# Patient Record
Sex: MALE | Race: WHITE | NOT HISPANIC OR LATINO | URBAN - METROPOLITAN AREA
[De-identification: names, ages, dates, MRNs, and addresses within clinical notes are randomized per-mention and may not be internally consistent; named-entity substitution may affect disease eponyms.]

---

## 2023-10-02 LAB
6-ACETYLMORPHINE: <25 NG/ML
7-AMINOCLONAZEPAM: <25 NG/ML
ALPHA-HYDROXYALPRAZOLAM: <25 NG/ML
ALPHA-HYDROXYMIDAZOLAM: <25 NG/ML
ALPRAZOLAM: <25 NG/ML
AMPHETAMINE (PRESENCE) IN URINE BY SCREEN METHOD: NORMAL
BARBITURATES PRESENCE IN URINE BY SCREEN METHOD: NORMAL
CANNABINOIDS IN URINE BY SCREEN METHOD: NORMAL
CHLORDIAZEPOXIDE: <25 NG/ML
CLONAZEPAM: <25 NG/ML
COCAINE (PRESENCE) IN URINE BY SCREEN METHOD: NORMAL
CODEINE: <50 NG/ML
CREATINE, URINE FOR DRUG: 122.4 MG/DL
DIAZEPAM: <25 NG/ML
DRUG SCREEN COMMENT URINE: NORMAL
EDDP: <25 NG/ML
FENTANYL CONFIRMATION, URINE: <2.5 NG/ML
HYDROCODONE: <25 NG/ML
HYDROMORPHONE: <25 NG/ML
LORAZEPAM: <25 NG/ML
METHADONE CONFIRMATION,URINE: <25 NG/ML
MIDAZOLAM: <25 NG/ML
MORPHINE URINE: <50 NG/ML
NORDIAZEPAM: <25 NG/ML
NORFENTANYL: <2.5 NG/ML
NORHYDROCODONE: <25 NG/ML
NOROXYCODONE: <25 NG/ML
O-DESMETHYLTRAMADOL: <50 NG/ML
OXAZEPAM: <25 NG/ML
OXYCODONE: <25 NG/ML
OXYMORPHONE: <25 NG/ML
PHENCYCLIDINE (PRESENCE) IN URINE BY SCREEN METHOD: NORMAL
TEMAZEPAM: <25 NG/ML
TRAMADOL: <50 NG/ML
ZOLPIDEM METABOLITE (ZCA): <25 NG/ML
ZOLPIDEM: <25 NG/ML

## 2023-10-12 DIAGNOSIS — F98.8 ATTENTION DEFICIT DISORDER (ADD) WITHOUT HYPERACTIVITY: ICD-10-CM

## 2023-10-12 RX ORDER — METHYLPHENIDATE HYDROCHLORIDE 20 MG/1
20 TABLET ORAL DAILY
Qty: 30 TABLET | Refills: 0 | Status: SHIPPED | OUTPATIENT
Start: 2023-10-12 | End: 2023-11-10 | Stop reason: SDUPTHER

## 2023-10-31 ENCOUNTER — TELEPHONE (OUTPATIENT)
Dept: VASCULAR MEDICINE | Facility: HOSPITAL | Age: 53
End: 2023-10-31
Payer: COMMERCIAL

## 2023-11-08 DIAGNOSIS — S35.228D: Primary | ICD-10-CM

## 2023-11-10 DIAGNOSIS — F98.8 ATTENTION DEFICIT DISORDER (ADD) WITHOUT HYPERACTIVITY: ICD-10-CM

## 2023-11-10 RX ORDER — METHYLPHENIDATE HYDROCHLORIDE 20 MG/1
20 TABLET ORAL DAILY
Qty: 30 TABLET | Refills: 0 | Status: SHIPPED | OUTPATIENT
Start: 2023-11-10 | End: 2023-12-05 | Stop reason: SDUPTHER

## 2023-11-21 ENCOUNTER — APPOINTMENT (OUTPATIENT)
Dept: CARDIOLOGY | Facility: HOSPITAL | Age: 53
End: 2023-11-21

## 2023-12-05 ENCOUNTER — TELEPHONE (OUTPATIENT)
Dept: VASCULAR MEDICINE | Facility: CLINIC | Age: 53
End: 2023-12-05

## 2023-12-05 ENCOUNTER — OFFICE VISIT (OUTPATIENT)
Dept: VASCULAR SURGERY | Facility: CLINIC | Age: 53
End: 2023-12-05
Payer: COMMERCIAL

## 2023-12-05 ENCOUNTER — CLINICAL SUPPORT (OUTPATIENT)
Dept: VASCULAR MEDICINE | Facility: CLINIC | Age: 53
End: 2023-12-05
Payer: COMMERCIAL

## 2023-12-05 VITALS — DIASTOLIC BLOOD PRESSURE: 77 MMHG | SYSTOLIC BLOOD PRESSURE: 138 MMHG | HEART RATE: 76 BPM

## 2023-12-05 DIAGNOSIS — I10 PRIMARY HYPERTENSION: ICD-10-CM

## 2023-12-05 DIAGNOSIS — Z79.899 MEDICATION MANAGEMENT: ICD-10-CM

## 2023-12-05 DIAGNOSIS — F98.8 ATTENTION DEFICIT DISORDER (ADD) WITHOUT HYPERACTIVITY: ICD-10-CM

## 2023-12-05 DIAGNOSIS — I77.79 DISSECTION OF MESENTERIC ARTERY (MULTI): Primary | ICD-10-CM

## 2023-12-05 DIAGNOSIS — I77.79 DISSECTION OF OTHER SPECIFIED ARTERY (MULTI): ICD-10-CM

## 2023-12-05 DIAGNOSIS — S35.228D: ICD-10-CM

## 2023-12-05 PROCEDURE — 99212 OFFICE O/P EST SF 10 MIN: CPT | Performed by: SURGERY

## 2023-12-05 PROCEDURE — 3075F SYST BP GE 130 - 139MM HG: CPT | Performed by: SURGERY

## 2023-12-05 PROCEDURE — 93975 VASCULAR STUDY: CPT | Performed by: SURGERY

## 2023-12-05 PROCEDURE — 3078F DIAST BP <80 MM HG: CPT | Performed by: SURGERY

## 2023-12-05 PROCEDURE — 93975 VASCULAR STUDY: CPT

## 2023-12-05 RX ORDER — METHYLPHENIDATE HYDROCHLORIDE 20 MG/1
TABLET ORAL
Qty: 30 TABLET | Refills: 0 | Status: SHIPPED | OUTPATIENT
Start: 2023-12-05 | End: 2024-01-08 | Stop reason: SDUPTHER

## 2023-12-05 RX ORDER — METHYLPHENIDATE HYDROCHLORIDE EXTENDED RELEASE 10 MG/1
TABLET ORAL
Qty: 30 TABLET | Refills: 0 | Status: SHIPPED | OUTPATIENT
Start: 2023-12-05 | End: 2024-01-08 | Stop reason: SDUPTHER

## 2023-12-05 RX ORDER — DULOXETIN HYDROCHLORIDE 60 MG/1
60 CAPSULE, DELAYED RELEASE ORAL DAILY
COMMUNITY
End: 2024-01-24 | Stop reason: SDUPTHER

## 2023-12-05 RX ORDER — ARIPIPRAZOLE 5 MG/1
5 TABLET ORAL DAILY
COMMUNITY
End: 2024-01-24 | Stop reason: SDUPTHER

## 2023-12-05 ASSESSMENT — COLUMBIA-SUICIDE SEVERITY RATING SCALE - C-SSRS
6. HAVE YOU EVER DONE ANYTHING, STARTED TO DO ANYTHING, OR PREPARED TO DO ANYTHING TO END YOUR LIFE?: NO
1. IN THE PAST MONTH, HAVE YOU WISHED YOU WERE DEAD OR WISHED YOU COULD GO TO SLEEP AND NOT WAKE UP?: NO
2. HAVE YOU ACTUALLY HAD ANY THOUGHTS OF KILLING YOURSELF?: NO

## 2023-12-05 ASSESSMENT — PATIENT HEALTH QUESTIONNAIRE - PHQ9
1. LITTLE INTEREST OR PLEASURE IN DOING THINGS: NOT AT ALL
2. FEELING DOWN, DEPRESSED OR HOPELESS: NOT AT ALL
SUM OF ALL RESPONSES TO PHQ9 QUESTIONS 1 AND 2: 0

## 2023-12-05 ASSESSMENT — LIFESTYLE VARIABLES
HOW OFTEN DO YOU HAVE SIX OR MORE DRINKS ON ONE OCCASION: NEVER
SKIP TO QUESTIONS 9-10: 1
HOW OFTEN DO YOU HAVE A DRINK CONTAINING ALCOHOL: NEVER
AUDIT-C TOTAL SCORE: 0
HOW MANY STANDARD DRINKS CONTAINING ALCOHOL DO YOU HAVE ON A TYPICAL DAY: PATIENT DOES NOT DRINK

## 2023-12-05 ASSESSMENT — ENCOUNTER SYMPTOMS
DEPRESSION: 0
OCCASIONAL FEELINGS OF UNSTEADINESS: 0
LOSS OF SENSATION IN FEET: 0

## 2023-12-05 NOTE — PROGRESS NOTES
Known to us from hospitalization in September with abdominal pain and findings of SMA dissection.  He did not follow-up with us as planned at 1 month and his medications lapsed including lisinopril and his dual antiplatelet therapy.  He currently has no symptoms of pain.  He wishes to return to work.  His ultrasound today shows no dissection and no stenosis.  His abdomen is soft and flaccid.    He does not have a primary care physician.  I am going to refer him to 1 for long-term management of his hypertension.  He is moderately hypertensive today to 138 mm with a systolic.  I went to have him resume his lisinopril and an aspirin only.  He may return to work without any restrictions.  He has been over 2 months since his event and he is completely asymptomatic and he has good flows on his duplex.  He may follow-up with us as needed.    Total time 15min

## 2023-12-13 ENCOUNTER — OFFICE VISIT (OUTPATIENT)
Dept: CARDIOLOGY | Facility: CLINIC | Age: 53
End: 2023-12-13
Payer: COMMERCIAL

## 2023-12-13 ENCOUNTER — LAB (OUTPATIENT)
Dept: LAB | Facility: LAB | Age: 53
End: 2023-12-13
Payer: COMMERCIAL

## 2023-12-13 VITALS
DIASTOLIC BLOOD PRESSURE: 87 MMHG | WEIGHT: 180 LBS | HEIGHT: 65 IN | BODY MASS INDEX: 29.99 KG/M2 | RESPIRATION RATE: 18 BRPM | SYSTOLIC BLOOD PRESSURE: 132 MMHG | HEART RATE: 63 BPM

## 2023-12-13 DIAGNOSIS — I77.79 DISSECTION OF MESENTERIC ARTERY (MULTI): ICD-10-CM

## 2023-12-13 DIAGNOSIS — I77.79 DISSECTION OF MESENTERIC ARTERY (MULTI): Primary | ICD-10-CM

## 2023-12-13 DIAGNOSIS — I10 PRIMARY HYPERTENSION: ICD-10-CM

## 2023-12-13 DIAGNOSIS — Z79.899 MEDICATION MANAGEMENT: ICD-10-CM

## 2023-12-13 LAB
CHOLEST SERPL-MCNC: 280 MG/DL (ref 0–199)
CHOLESTEROL/HDL RATIO: 7.4
HDLC SERPL-MCNC: 37.6 MG/DL
LDLC SERPL CALC-MCNC: 173 MG/DL
NON HDL CHOLESTEROL: 242 MG/DL (ref 0–149)
TRIGL SERPL-MCNC: 349 MG/DL (ref 0–149)
VLDL: 70 MG/DL (ref 0–40)

## 2023-12-13 PROCEDURE — 80061 LIPID PANEL: CPT

## 2023-12-13 PROCEDURE — 36415 COLL VENOUS BLD VENIPUNCTURE: CPT

## 2023-12-13 PROCEDURE — 99215 OFFICE O/P EST HI 40 MIN: CPT | Performed by: INTERNAL MEDICINE

## 2023-12-13 PROCEDURE — 3075F SYST BP GE 130 - 139MM HG: CPT | Performed by: INTERNAL MEDICINE

## 2023-12-13 PROCEDURE — 80324 DRUG SCREEN AMPHETAMINES 1/2: CPT

## 2023-12-13 PROCEDURE — 3079F DIAST BP 80-89 MM HG: CPT | Performed by: INTERNAL MEDICINE

## 2023-12-13 RX ORDER — LISINOPRIL 10 MG/1
10 TABLET ORAL DAILY
Qty: 90 TABLET | Refills: 3 | Status: SHIPPED | OUTPATIENT
Start: 2023-12-13 | End: 2024-12-12

## 2023-12-13 RX ORDER — ATORVASTATIN CALCIUM 40 MG/1
40 TABLET, FILM COATED ORAL DAILY
Qty: 90 TABLET | Refills: 3 | Status: SHIPPED | OUTPATIENT
Start: 2023-12-13 | End: 2024-12-12

## 2023-12-13 ASSESSMENT — PATIENT HEALTH QUESTIONNAIRE - PHQ9
1. LITTLE INTEREST OR PLEASURE IN DOING THINGS: NOT AT ALL
SUM OF ALL RESPONSES TO PHQ9 QUESTIONS 1 AND 2: 0
2. FEELING DOWN, DEPRESSED OR HOPELESS: NOT AT ALL

## 2023-12-13 NOTE — PROGRESS NOTES
"OUTPATIENT CONSULTATION -  VASCULAR MEDICINE    DOS: 2023    REQUESTING PHYSICIAN:  Jovana Vazquez; No PCP    REASON FOR CONSULT:  here for hospital follow up of SMA dissection    HISTORY OF PRESENT ILLNESS:     54 yo man here for hospital follow up of SMA dissection. Admitted to [facility] [place] -2023 with ABD pain. CT c/w SMA dissection. Was at a  and developed sudden onset ABD pain. No preceding trauma but was a \"very emotional\" experience. +associated diaphoresis and emesis x1. CTAP - dissection of the SMA 5.8 cm from the OS with multiple proximal branches with decreased opacification. Rx heparin gtt and sent to [facility] [place]. He is a never smoker. During admission seen by  recommended DAPT, atorvastatin and lisinopril. DC home with atorvastatin 10 mg (?). Did not keep initial follow up - has been out of ASA, clopidogrel, lisinopril and atorvastatin - only had a 30 day supply. Seen recently by Dr. Ortiz and recommended to resume the lisinopril and ASA - but no Rx was sent. Also referred for PCP.      PMH/PSH:    GERD  ADD  Depression    FAMILY HISTORY:     No CTD  No AAA or dissection    SOCIAL HISTORY:     Tobacco never  Employment works as a  - reports off lifting estrictions per Dr. Ortiz    REVIEW OF SYSTEMS:     No fevers, chills, weight is stable  No sores, ulcers, rashes, skin lesions  No HA, SZ, syncope, stroke, TIA  No CP,  chest pressure  No cough, SOB  No ABD pain  No N/V/D/C  No BRBPR, melena, hematuria  No bleeding  No edema, no calf pain    PHYSICAL EXAMINATION:   /87 (BP Location: Left arm, Patient Position: Sitting)   Pulse 63   Resp 18   Ht 1.651 m (5' 5\")   Wt 81.6 kg (180 lb)   BMI 29.95 kg/m²     Gen: Appears well, NAD  HEENT: WNL  No carotid bruits  Chest: CTA  CVS: regular without murmur +[id number]  Abd: soft, NT/ND, no bruits,   Ext: no edema, nontender  Skin: good condition without wounds or lesions  Pulses: DP 2+; PT 2+  Neuro: grossly normal, CN intact, BEBO x 4  Mood and " affect appropriate    ADDITIONAL DATA:   No compression worn. Calf measurements; R- 38.5 CM L 39.0 CM.    RENÉE - negative    US 12/5/2023:  CONCLUSIONS:  Mesenteric: Celiac artery demonstrates no evidence of hemodynamically significant stenosis, SMA demonstrates no evidence of hemodynamically significant stenosis, Hepatic artery appears widely patent and the ELENA appears widely patent. Mild wall irregularity noted within superior mesenteric artery. Unable to adequately visualize splenic artery due to overlying bowel gas.  Additional     US 9/26/2023:  CONCLUSIONS:  Mesenteric: The celiac, hepatic, splenic and SMA appear widely patent with no evidence of stenosis. The patient was NPO for this study. There appears to be echogenic material noted along the wall of the SMA.    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  +S4 - check ECHO  Discussed the need for a PCP

## 2023-12-14 ENCOUNTER — HOSPITAL ENCOUNTER (OUTPATIENT)
Dept: CARDIOLOGY | Facility: CLINIC | Age: 53
Discharge: HOME | End: 2023-12-14
Payer: COMMERCIAL

## 2023-12-14 ENCOUNTER — TELEPHONE (OUTPATIENT)
Dept: CARDIOLOGY | Facility: HOSPITAL | Age: 53
End: 2023-12-14

## 2023-12-14 DIAGNOSIS — R01.1 CARDIAC MURMUR, UNSPECIFIED: ICD-10-CM

## 2023-12-14 DIAGNOSIS — I10 PRIMARY HYPERTENSION: ICD-10-CM

## 2023-12-14 DIAGNOSIS — I48.91 ATRIAL FIBRILLATION, UNSPECIFIED TYPE (MULTI): Primary | ICD-10-CM

## 2023-12-14 DIAGNOSIS — I77.79 DISSECTION OF MESENTERIC ARTERY (MULTI): ICD-10-CM

## 2023-12-14 LAB
AORTIC VALVE MEAN GRADIENT: 3.2
AORTIC VALVE PEAK VELOCITY: 1.24
AV PEAK GRADIENT: 6.1
AVA (PEAK VEL): 3.48
AVA (VTI): 3.75
EJECTION FRACTION APICAL 4 CHAMBER: 64.1
EJECTION FRACTION: 67
LEFT ATRIUM VOLUME AREA LENGTH INDEX BSA: 21.9
LEFT VENTRICLE INTERNAL DIMENSION DIASTOLE: 3.87 (ref 3.5–6)
LEFT VENTRICULAR OUTFLOW TRACT DIAMETER: 2.18
MITRAL VALVE E/A RATIO: 1.26
MITRAL VALVE E/E' RATIO: 7.02
RIGHT VENTRICLE FREE WALL PEAK S': 13
TRICUSPID ANNULAR PLANE SYSTOLIC EXCURSION: 2

## 2023-12-14 PROCEDURE — 93306 TTE W/DOPPLER COMPLETE: CPT | Performed by: SPECIALIST

## 2023-12-14 PROCEDURE — 93306 TTE W/DOPPLER COMPLETE: CPT

## 2023-12-16 LAB
AMPHET UR-MCNC: <50 NG/ML
MDA UR-MCNC: <200 NG/ML
MDEA UR-MCNC: <200 NG/ML
MDMA UR-MCNC: <200 NG/ML
METHAMPHET UR-MCNC: <200 NG/ML
PHENTERMINE UR CFM-MCNC: <200 NG/ML

## 2023-12-28 ENCOUNTER — APPOINTMENT (OUTPATIENT)
Dept: BEHAVIORAL HEALTH | Facility: CLINIC | Age: 53
End: 2023-12-28
Payer: COMMERCIAL

## 2024-01-04 ENCOUNTER — APPOINTMENT (OUTPATIENT)
Dept: BEHAVIORAL HEALTH | Facility: CLINIC | Age: 54
End: 2024-01-04
Payer: COMMERCIAL

## 2024-01-08 DIAGNOSIS — F98.8 ATTENTION DEFICIT DISORDER (ADD) WITHOUT HYPERACTIVITY: ICD-10-CM

## 2024-01-08 DIAGNOSIS — F90.0 ATTENTION DEFICIT HYPERACTIVITY DISORDER (ADHD), PREDOMINANTLY INATTENTIVE TYPE: ICD-10-CM

## 2024-01-08 PROBLEM — F90.9 ADHD (ATTENTION DEFICIT HYPERACTIVITY DISORDER): Status: ACTIVE | Noted: 2024-01-08

## 2024-01-08 RX ORDER — METHYLPHENIDATE HYDROCHLORIDE 20 MG/1
TABLET ORAL
Qty: 30 TABLET | Refills: 0 | Status: SHIPPED | OUTPATIENT
Start: 2024-01-08 | End: 2024-02-05 | Stop reason: SDUPTHER

## 2024-01-08 RX ORDER — METHYLPHENIDATE HYDROCHLORIDE EXTENDED RELEASE 10 MG/1
TABLET ORAL
Qty: 30 TABLET | Refills: 0 | Status: SHIPPED | OUTPATIENT
Start: 2024-01-08 | End: 2024-02-05 | Stop reason: SDUPTHER

## 2024-01-15 ENCOUNTER — APPOINTMENT (OUTPATIENT)
Dept: BEHAVIORAL HEALTH | Facility: CLINIC | Age: 54
End: 2024-01-15
Payer: COMMERCIAL

## 2024-01-24 ENCOUNTER — OFFICE VISIT (OUTPATIENT)
Dept: BEHAVIORAL HEALTH | Facility: CLINIC | Age: 54
End: 2024-01-24
Payer: COMMERCIAL

## 2024-01-24 VITALS
DIASTOLIC BLOOD PRESSURE: 72 MMHG | WEIGHT: 200 LBS | HEART RATE: 89 BPM | BODY MASS INDEX: 33.28 KG/M2 | SYSTOLIC BLOOD PRESSURE: 122 MMHG

## 2024-01-24 DIAGNOSIS — F31.9 BIPOLAR AND RELATED DISORDER (MULTI): ICD-10-CM

## 2024-01-24 DIAGNOSIS — F90.0 ATTENTION DEFICIT HYPERACTIVITY DISORDER (ADHD), PREDOMINANTLY INATTENTIVE TYPE: ICD-10-CM

## 2024-01-24 PROCEDURE — 99213 OFFICE O/P EST LOW 20 MIN: CPT | Performed by: PSYCHIATRY & NEUROLOGY

## 2024-01-24 RX ORDER — ARIPIPRAZOLE 5 MG/1
5 TABLET ORAL DAILY
Qty: 90 TABLET | Refills: 0 | Status: SHIPPED | OUTPATIENT
Start: 2024-01-24 | End: 2024-04-25 | Stop reason: SDUPTHER

## 2024-01-24 RX ORDER — DULOXETIN HYDROCHLORIDE 60 MG/1
60 CAPSULE, DELAYED RELEASE ORAL DAILY
Qty: 90 CAPSULE | Refills: 1 | Status: SHIPPED | OUTPATIENT
Start: 2024-01-24 | End: 2024-04-25 | Stop reason: SDUPTHER

## 2024-01-24 SDOH — ECONOMIC STABILITY: FOOD INSECURITY: WITHIN THE PAST 12 MONTHS, THE FOOD YOU BOUGHT JUST DIDN'T LAST AND YOU DIDN'T HAVE MONEY TO GET MORE.: NEVER TRUE

## 2024-01-24 SDOH — ECONOMIC STABILITY: INCOME INSECURITY: IN THE LAST 12 MONTHS, WAS THERE A TIME WHEN YOU WERE NOT ABLE TO PAY THE MORTGAGE OR RENT ON TIME?: NO

## 2024-01-24 SDOH — ECONOMIC STABILITY: HOUSING INSECURITY
IN THE LAST 12 MONTHS, WAS THERE A TIME WHEN YOU DID NOT HAVE A STEADY PLACE TO SLEEP OR SLEPT IN A SHELTER (INCLUDING NOW)?: NO

## 2024-01-24 SDOH — ECONOMIC STABILITY: TRANSPORTATION INSECURITY
IN THE PAST 12 MONTHS, HAS LACK OF TRANSPORTATION KEPT YOU FROM MEETINGS, WORK, OR FROM GETTING THINGS NEEDED FOR DAILY LIVING?: NO

## 2024-01-24 SDOH — ECONOMIC STABILITY: HOUSING INSECURITY: IN THE LAST 12 MONTHS, HOW MANY PLACES HAVE YOU LIVED?: 1

## 2024-01-24 SDOH — ECONOMIC STABILITY: GENERAL
WHICH OF THE FOLLOWING WOULD YOU LIKE TO GET CONNECTED TO IN ORDER TO RECEIVE A DISCOUNT OR FOR FREE? (CHOOSE ALL THAT APPLY): NONE OF THESE

## 2024-01-24 SDOH — ECONOMIC STABILITY: FOOD INSECURITY: WITHIN THE PAST 12 MONTHS, YOU WORRIED THAT YOUR FOOD WOULD RUN OUT BEFORE YOU GOT MONEY TO BUY MORE.: NEVER TRUE

## 2024-01-24 SDOH — HEALTH STABILITY: PHYSICAL HEALTH: ON AVERAGE, HOW MANY MINUTES DO YOU ENGAGE IN EXERCISE AT THIS LEVEL?: 60 MIN

## 2024-01-24 SDOH — HEALTH STABILITY: PHYSICAL HEALTH: ON AVERAGE, HOW MANY DAYS PER WEEK DO YOU ENGAGE IN MODERATE TO STRENUOUS EXERCISE (LIKE A BRISK WALK)?: 3 DAYS

## 2024-01-24 SDOH — ECONOMIC STABILITY: GENERAL
WHICH OF THE FOLLOWING DO YOU KNOW HOW TO USE AND HAVE ACCESS TO EVERY DAY? (CHOOSE ALL THAT APPLY): SMARTPHONE WITH CELLULAR DATA PLAN;DESKTOP COMPUTER, LAPTOP COMPUTER, OR TABLET WITH BROADBAND INTERNET CONNECTION

## 2024-01-24 SDOH — ECONOMIC STABILITY: TRANSPORTATION INSECURITY
IN THE PAST 12 MONTHS, HAS THE LACK OF TRANSPORTATION KEPT YOU FROM MEDICAL APPOINTMENTS OR FROM GETTING MEDICATIONS?: NO

## 2024-01-24 ASSESSMENT — SOCIAL DETERMINANTS OF HEALTH (SDOH)
IN A TYPICAL WEEK, HOW MANY TIMES DO YOU TALK ON THE PHONE WITH FAMILY, FRIENDS, OR NEIGHBORS?: MORE THAN THREE TIMES A WEEK
HOW OFTEN DO YOU ATTEND CHURCH OR RELIGIOUS SERVICES?: NEVER
WITHIN THE LAST YEAR, HAVE YOU BEEN KICKED, HIT, SLAPPED, OR OTHERWISE PHYSICALLY HURT BY YOUR PARTNER OR EX-PARTNER?: NO
WITHIN THE LAST YEAR, HAVE YOU BEEN AFRAID OF YOUR PARTNER OR EX-PARTNER?: NO
WITHIN THE LAST YEAR, HAVE TO BEEN RAPED OR FORCED TO HAVE ANY KIND OF SEXUAL ACTIVITY BY YOUR PARTNER OR EX-PARTNER?: NO
WITHIN THE LAST YEAR, HAVE YOU BEEN HUMILIATED OR EMOTIONALLY ABUSED IN OTHER WAYS BY YOUR PARTNER OR EX-PARTNER?: NO
ARE YOU MARRIED, WIDOWED, DIVORCED, SEPARATED, NEVER MARRIED, OR LIVING WITH A PARTNER?: NEVER MARRIED
HOW OFTEN DO YOU ATTENT MEETINGS OF THE CLUB OR ORGANIZATION YOU BELONG TO?: MORE THAN 4 TIMES PER YEAR
HOW HARD IS IT FOR YOU TO PAY FOR THE VERY BASICS LIKE FOOD, HOUSING, MEDICAL CARE, AND HEATING?: NOT HARD AT ALL
IN THE PAST 12 MONTHS, HAS THE ELECTRIC, GAS, OIL, OR WATER COMPANY THREATENED TO SHUT OFF SERVICE IN YOUR HOME?: NO
DO YOU BELONG TO ANY CLUBS OR ORGANIZATIONS SUCH AS CHURCH GROUPS UNIONS, FRATERNAL OR ATHLETIC GROUPS, OR SCHOOL GROUPS?: YES
HOW OFTEN DO YOU GET TOGETHER WITH FRIENDS OR RELATIVES?: MORE THAN THREE TIMES A WEEK

## 2024-01-24 NOTE — PROGRESS NOTES
Subjective   Patient ID: Adán Liu is a 53 y.o. male who presents for No chief complaint on file. I am doing pretty good.     The patient is alert, fully oriented, language is intact, and recent and remote memory intact. The patient denies any suicidal or homicidal ideation or plans. The patient presents with no depressive, manic or psychotic symptoms. Thought is logical and clear. No disturbances of judgment or insight are exhibited. No behavioral disturbances are present on examination.        Review of Systems   Neurological:         The patient is alert, fully oriented, language is intact, and recent and remote memory intact. The patient denies any suicidal or homicidal ideation or plans. The patient presents with no depressive, manic or psychotic symptoms. Thought is logical and clear. No disturbances of judgment or insight are exhibited. No behavioral disturbances are present on examination.       Psychiatric/Behavioral:          The patient is alert, fully oriented, language is intact, and recent and remote memory intact. The patient denies any suicidal or homicidal ideation or plans. The patient presents with no depressive, manic or psychotic symptoms. Thought is logical and clear. No disturbances of judgment or insight are exhibited. No behavioral disturbances are present on examination.       All other systems reviewed and are negative.      Objective   Physical Exam  Constitutional:       Appearance: Normal appearance.   Neurological:      General: No focal deficit present.      Mental Status: He is alert and oriented to person, place, and time. Mental status is at baseline.      Comments: The patient is alert, fully oriented, language is intact, and recent and remote memory intact. The patient denies any suicidal or homicidal ideation or plans. The patient presents with no depressive, manic or psychotic symptoms. Thought is logical and clear. No disturbances of judgment or insight are exhibited. No  behavioral disturbances are present on examination.       Psychiatric:         Mood and Affect: Mood normal.         Behavior: Behavior normal.         Thought Content: Thought content normal.         Judgment: Judgment normal.      Comments: The patient is alert, fully oriented, language is intact, and recent and remote memory intact. The patient denies any suicidal or homicidal ideation or plans. The patient presents with no depressive, manic or psychotic symptoms. Thought is logical and clear. No disturbances of judgment or insight are exhibited. No behavioral disturbances are present on examination.             Lab Review:       Assessment/Plan   The FDA risks, benefits & alternatives to the medications prescribed were explained to you today. You were able to understand & repeat these risks, benefits & alternatives to these prescribed medications. You have agreed to proceed with treatment with the medications discussed based on the conclusion that the benefit outweighs the risks of this treatment regimen: continue aripiprazole 5 mg daily, duloxetine 60 mg daily, and methylphenidate 20 mg in the morning before work and methylphenidate ER 10 mg in the early afternoon daily. Your next contract signature and urine toxicology screen will be due in September of 2024. Follow up in late April of 2024.

## 2024-02-05 DIAGNOSIS — F98.8 ATTENTION DEFICIT DISORDER (ADD) WITHOUT HYPERACTIVITY: ICD-10-CM

## 2024-02-05 RX ORDER — METHYLPHENIDATE HYDROCHLORIDE EXTENDED RELEASE 10 MG/1
TABLET ORAL
Qty: 30 TABLET | Refills: 0 | Status: SHIPPED | OUTPATIENT
Start: 2024-02-05 | End: 2024-03-06 | Stop reason: SDUPTHER

## 2024-02-05 RX ORDER — METHYLPHENIDATE HYDROCHLORIDE 20 MG/1
TABLET ORAL
Qty: 30 TABLET | Refills: 0 | Status: SHIPPED | OUTPATIENT
Start: 2024-02-05 | End: 2024-03-06 | Stop reason: SDUPTHER

## 2024-03-06 DIAGNOSIS — F98.8 ATTENTION DEFICIT DISORDER (ADD) WITHOUT HYPERACTIVITY: ICD-10-CM

## 2024-03-06 RX ORDER — METHYLPHENIDATE HYDROCHLORIDE 20 MG/1
TABLET ORAL
Qty: 30 TABLET | Refills: 0 | Status: SHIPPED | OUTPATIENT
Start: 2024-03-06 | End: 2024-04-02 | Stop reason: SDUPTHER

## 2024-03-06 RX ORDER — METHYLPHENIDATE HYDROCHLORIDE EXTENDED RELEASE 10 MG/1
TABLET ORAL
Qty: 30 TABLET | Refills: 0 | Status: SHIPPED | OUTPATIENT
Start: 2024-03-06 | End: 2024-04-02 | Stop reason: SDUPTHER

## 2024-03-13 ENCOUNTER — APPOINTMENT (OUTPATIENT)
Dept: CARDIOLOGY | Facility: CLINIC | Age: 54
End: 2024-03-13
Payer: COMMERCIAL

## 2024-04-02 DIAGNOSIS — F98.8 ATTENTION DEFICIT DISORDER (ADD) WITHOUT HYPERACTIVITY: ICD-10-CM

## 2024-04-02 RX ORDER — METHYLPHENIDATE HYDROCHLORIDE EXTENDED RELEASE 10 MG/1
TABLET ORAL
Qty: 30 TABLET | Refills: 0 | Status: SHIPPED | OUTPATIENT
Start: 2024-04-02 | End: 2024-04-25 | Stop reason: WASHOUT

## 2024-04-02 RX ORDER — METHYLPHENIDATE HYDROCHLORIDE 20 MG/1
TABLET ORAL
Qty: 30 TABLET | Refills: 0 | Status: SHIPPED | OUTPATIENT
Start: 2024-04-02 | End: 2024-04-25 | Stop reason: SDUPTHER

## 2024-04-04 ENCOUNTER — TELEPHONE (OUTPATIENT)
Dept: BEHAVIORAL HEALTH | Facility: CLINIC | Age: 54
End: 2024-04-04
Payer: COMMERCIAL

## 2024-04-19 ENCOUNTER — APPOINTMENT (OUTPATIENT)
Dept: BEHAVIORAL HEALTH | Facility: CLINIC | Age: 54
End: 2024-04-19
Payer: COMMERCIAL

## 2024-04-24 ENCOUNTER — APPOINTMENT (OUTPATIENT)
Dept: CARDIOLOGY | Facility: CLINIC | Age: 54
End: 2024-04-24
Payer: COMMERCIAL

## 2024-04-25 ENCOUNTER — TELEMEDICINE (OUTPATIENT)
Dept: BEHAVIORAL HEALTH | Facility: CLINIC | Age: 54
End: 2024-04-25
Payer: COMMERCIAL

## 2024-04-25 DIAGNOSIS — F90.0 ATTENTION DEFICIT HYPERACTIVITY DISORDER (ADHD), PREDOMINANTLY INATTENTIVE TYPE: ICD-10-CM

## 2024-04-25 DIAGNOSIS — F31.9 BIPOLAR AND RELATED DISORDER (MULTI): ICD-10-CM

## 2024-04-25 DIAGNOSIS — F98.8 ATTENTION DEFICIT DISORDER (ADD) WITHOUT HYPERACTIVITY: ICD-10-CM

## 2024-04-25 DIAGNOSIS — F41.8 MIXED ANXIETY AND DEPRESSIVE DISORDER: ICD-10-CM

## 2024-04-25 PROCEDURE — 99214 OFFICE O/P EST MOD 30 MIN: CPT | Performed by: PSYCHIATRY & NEUROLOGY

## 2024-04-25 RX ORDER — DULOXETIN HYDROCHLORIDE 60 MG/1
60 CAPSULE, DELAYED RELEASE ORAL DAILY
Qty: 90 CAPSULE | Refills: 1 | Status: SHIPPED | OUTPATIENT
Start: 2024-04-25 | End: 2024-10-22

## 2024-04-25 RX ORDER — ARIPIPRAZOLE 5 MG/1
5 TABLET ORAL DAILY
Qty: 90 TABLET | Refills: 1 | Status: SHIPPED | OUTPATIENT
Start: 2024-04-25 | End: 2024-10-22

## 2024-04-25 RX ORDER — METHYLPHENIDATE HYDROCHLORIDE 20 MG/1
20 TABLET ORAL 2 TIMES DAILY
Qty: 60 TABLET | Refills: 0 | Status: SHIPPED | OUTPATIENT
Start: 2024-04-25 | End: 2024-05-22 | Stop reason: SDUPTHER

## 2024-04-25 NOTE — PROGRESS NOTES
Subjective   Patient ID: Adán Liu is a 53 y.o. male who presents for No chief complaint on file. I am doing pretty good.     The patient engaged in a telehealth session via Epic audio visual or phone with this provider practicing within the [facility]. The identity of the patient was verified by their date of birth and last four digits of their social security number. The provider demonstrated that confidentially was preserved at their location. The patient was informed that they were responsible for ensuring confidentially was secured at their location. The patient's location was documented for emergency purposes. The patient was informed of the necessary steps that would occur if an emergency was to occur or technology failed during session.     HPI: The patient reports he still has trouble concentrating and doing his job in the afternoon.     MSE: The patient is alert, fully oriented, language is intact, and recent and remote memory intact. The patient denies any suicidal or homicidal ideation or plans. The patient presents with no depressive, manic or psychotic symptoms. Thought is logical and clear. No disturbances of judgment or insight are exhibited. No behavioral disturbances are present on examination.        Review of Systems   Neurological:         The patient is alert, fully oriented, language is intact, and recent and remote memory intact. The patient denies any suicidal or homicidal ideation or plans. The patient presents with no depressive, manic or psychotic symptoms. Thought is logical and clear. No disturbances of judgment or insight are exhibited. No behavioral disturbances are present on examination.       Psychiatric/Behavioral:          The patient is alert, fully oriented, language is intact, and recent and remote memory intact. The patient denies any suicidal or homicidal ideation or plans. The patient presents with no depressive, manic or psychotic symptoms. Thought is logical and  clear. No disturbances of judgment or insight are exhibited. No behavioral disturbances are present on examination.       All other systems reviewed and are negative.      Objective   Physical Exam  Constitutional:       Appearance: Normal appearance.   Neurological:      General: No focal deficit present.      Mental Status: He is alert and oriented to person, place, and time. Mental status is at baseline.      Comments: The patient is alert, fully oriented, language is intact, and recent and remote memory intact. The patient denies any suicidal or homicidal ideation or plans. The patient presents with no depressive, manic or psychotic symptoms. Thought is logical and clear. No disturbances of judgment or insight are exhibited. No behavioral disturbances are present on examination.       Psychiatric:         Mood and Affect: Mood normal.         Behavior: Behavior normal.         Thought Content: Thought content normal.         Judgment: Judgment normal.      Comments: The patient is alert, fully oriented, language is intact, and recent and remote memory intact. The patient denies any suicidal or homicidal ideation or plans. The patient presents with no depressive, manic or psychotic symptoms. Thought is logical and clear. No disturbances of judgment or insight are exhibited. No behavioral disturbances are present on examination.             Lab Review:       Assessment/Plan   The FDA risks, benefits & alternatives to the medications prescribed were explained to you today. You were able to understand & repeat these risks, benefits & alternatives to these prescribed medications. You have agreed to proceed with treatment with the medications discussed based on the conclusion that the benefit outweighs the risks of this treatment regimen: continue aripiprazole 5 mg daily, duloxetine 60 mg daily, and methylphenidate 20 mg in the morning before work and in the early afternoon daily.     Your next contract signature and  urine toxicology screen will be due in September of 2024.     Follow up in July of 2024.                                 Psych Review of Symptoms:    ADHD:   Inattention Symptoms: Difficulty sustaining attention, difficulty with follow through, difficulty organizing, easily distracted and loses/misplaces belongings.       Anxiety: Patient denied any symptoms.         Developmental and Sensory Concerns: Patient denied any symptoms.         Depressive Symptoms: Patient denied any symptoms.         Disruptive and Conduct Symptoms: Patient denied any symptoms.         Eating / Feeding Concerns: Patient denied any symptoms.         Elimination Symptoms: Patient denied any symptoms.         Manic Symptoms: Patient denied any symptoms.         Obsessive-Compulsive Symptoms: Patient denied any symptoms.         Psychotic Symptoms: Patient denied any symptoms.           Trauma Related Symptoms: Patient denied any symptoms.           Sleep Concerns: Patient denied any symptoms.

## 2024-05-22 ENCOUNTER — APPOINTMENT (OUTPATIENT)
Dept: CARDIOLOGY | Facility: CLINIC | Age: 54
End: 2024-05-22
Payer: COMMERCIAL

## 2024-05-22 DIAGNOSIS — F90.0 ATTENTION DEFICIT HYPERACTIVITY DISORDER (ADHD), PREDOMINANTLY INATTENTIVE TYPE: ICD-10-CM

## 2024-05-22 DIAGNOSIS — F98.8 ATTENTION DEFICIT DISORDER (ADD) WITHOUT HYPERACTIVITY: ICD-10-CM

## 2024-05-22 RX ORDER — METHYLPHENIDATE HYDROCHLORIDE 20 MG/1
20 TABLET ORAL 2 TIMES DAILY
Qty: 60 TABLET | Refills: 0 | Status: SHIPPED | OUTPATIENT
Start: 2024-05-24 | End: 2024-06-23

## 2024-06-06 ENCOUNTER — OFFICE VISIT (OUTPATIENT)
Dept: BEHAVIORAL HEALTH | Facility: CLINIC | Age: 54
End: 2024-06-06
Payer: COMMERCIAL

## 2024-06-06 VITALS
BODY MASS INDEX: 31.99 KG/M2 | SYSTOLIC BLOOD PRESSURE: 125 MMHG | DIASTOLIC BLOOD PRESSURE: 77 MMHG | WEIGHT: 192 LBS | HEIGHT: 65 IN | HEART RATE: 80 BPM

## 2024-06-24 DIAGNOSIS — F98.8 ATTENTION DEFICIT DISORDER (ADD) WITHOUT HYPERACTIVITY: ICD-10-CM

## 2024-06-24 RX ORDER — METHYLPHENIDATE HYDROCHLORIDE 20 MG/1
20 TABLET ORAL 2 TIMES DAILY
Qty: 60 TABLET | Refills: 0 | Status: SHIPPED | OUTPATIENT
Start: 2024-06-24 | End: 2024-07-24

## 2024-07-10 ENCOUNTER — OFFICE VISIT (OUTPATIENT)
Dept: CARDIOLOGY | Facility: CLINIC | Age: 54
End: 2024-07-10
Payer: COMMERCIAL

## 2024-07-10 VITALS
HEART RATE: 79 BPM | HEIGHT: 65 IN | BODY MASS INDEX: 32.49 KG/M2 | WEIGHT: 195 LBS | DIASTOLIC BLOOD PRESSURE: 79 MMHG | RESPIRATION RATE: 16 BRPM | SYSTOLIC BLOOD PRESSURE: 120 MMHG

## 2024-07-10 DIAGNOSIS — I77.79 DISSECTION OF MESENTERIC ARTERY (MULTI): ICD-10-CM

## 2024-07-10 DIAGNOSIS — I10 PRIMARY HYPERTENSION: ICD-10-CM

## 2024-07-10 PROCEDURE — 1036F TOBACCO NON-USER: CPT | Performed by: INTERNAL MEDICINE

## 2024-07-10 PROCEDURE — 3078F DIAST BP <80 MM HG: CPT | Performed by: INTERNAL MEDICINE

## 2024-07-10 PROCEDURE — 99214 OFFICE O/P EST MOD 30 MIN: CPT | Performed by: INTERNAL MEDICINE

## 2024-07-10 PROCEDURE — 3074F SYST BP LT 130 MM HG: CPT | Performed by: INTERNAL MEDICINE

## 2024-07-10 RX ORDER — OMEPRAZOLE 20 MG/1
20 TABLET, DELAYED RELEASE ORAL
COMMUNITY

## 2024-07-10 RX ORDER — LISINOPRIL 10 MG/1
10 TABLET ORAL DAILY
Qty: 90 TABLET | Refills: 3 | Status: SHIPPED | OUTPATIENT
Start: 2024-07-10 | End: 2025-07-10

## 2024-07-10 RX ORDER — ATORVASTATIN CALCIUM 40 MG/1
40 TABLET, FILM COATED ORAL DAILY
Qty: 90 TABLET | Refills: 3 | Status: SHIPPED | OUTPATIENT
Start: 2024-07-10 | End: 2025-07-10

## 2024-07-10 RX ORDER — ASPIRIN 81 MG/1
81 TABLET ORAL DAILY
Qty: 90 TABLET | Refills: 3 | Status: SHIPPED | OUTPATIENT
Start: 2024-07-10 | End: 2025-07-10

## 2024-07-10 ASSESSMENT — PAIN SCALES - GENERAL: PAINLEVEL: 0-NO PAIN

## 2024-07-10 ASSESSMENT — PATIENT HEALTH QUESTIONNAIRE - PHQ9
SUM OF ALL RESPONSES TO PHQ9 QUESTIONS 1 AND 2: 0
2. FEELING DOWN, DEPRESSED OR HOPELESS: NOT AT ALL
1. LITTLE INTEREST OR PLEASURE IN DOING THINGS: NOT AT ALL

## 2024-07-10 ASSESSMENT — COLUMBIA-SUICIDE SEVERITY RATING SCALE - C-SSRS
6. HAVE YOU EVER DONE ANYTHING, STARTED TO DO ANYTHING, OR PREPARED TO DO ANYTHING TO END YOUR LIFE?: NO
2. HAVE YOU ACTUALLY HAD ANY THOUGHTS OF KILLING YOURSELF?: NO
1. IN THE PAST MONTH, HAVE YOU WISHED YOU WERE DEAD OR WISHED YOU COULD GO TO SLEEP AND NOT WAKE UP?: NO

## 2024-07-10 NOTE — PATIENT INSTRUCTIONS
ASSESSMENT/PLAN:    here for follow up of SMA dissection - discussed the need for the aspirin, atorvastatin, and lisinopril. Renewed again today. Discussed CTA to look for healing or degeneration. Follow up 6 months.

## 2024-07-10 NOTE — PROGRESS NOTES
"OUTPATIENT FOLLOW-UP -  VASCULAR MEDICINE    DOS: 7/10/2024  Last seen:    2023    REQUESTING PHYSICIAN:  Grabiel anderson, no PCP    REASON FOR FOLLOW-UP:  here for follow up of SMA dissection    HISTORY OF PRESENT ILLNESS:     53 yo man here for follow up of SMA dissection. Reports doing well. Last seen Dec 2023. Reports stopped the ASA, atorvastatin, and lisinopril. Reports did not get Rx - but these were sent at our last follow up. Reports started working out. Taking classes 3 x a week - \"not stressful\". Base, burn and build. Did not follow up with a PCP either.     From last note:   Admitted to [facility] [place] -2023 with ABD pain. CT c/w SMA dissection. Was at a  and developed sudden onset ABD pain. No preceding trauma but was a \"very emotional\" experience. +associated diaphoresis and emesis x1. CTAP - dissection of the SMA 5.8 cm from the OS with multiple proximal branches with decreased opacification. Rx heparin gtt and sent to [facility] [place]. He is a never smoker. During admission seen by TOY recommended DAPT, atorvastatin and lisinopril. DC home with atorvastatin 10 mg (?). Did not keep initial follow up - has been out of ASA, clopidogrel, lisinopril and atorvastatin - only had a 30 day supply. Seen recently by Dr. Ortiz and recommended to resume the lisinopril and ASA - but no Rx was sent. Also referred for PCP.      Last imaging 2023:  CONCLUSIONS:  Mesenteric: Celiac artery demonstrates no evidence of hemodynamically significant stenosis, SMA demonstrates no evidence of hemodynamically significant stenosis, Hepatic artery appears widely patent and the ELENA appears widely patent. Mild wall irregularity noted within superior mesenteric artery. Unable to adequately visualize splenic artery due to overlying bowel gas.  Additional Findings:  Significant overlying bowel gas.      REVIEW OF SYSTEMS:     weight is stable  No CP, chest pressure  No cough, SOB  No ABD pain  No post-prandial pain  No BRBPR, melena, " "hematuria  No bleeding  No edema, no calf pain    PHYSICAL EXAMINATION:   /79 (BP Location: Left arm, Patient Position: Sitting)   Pulse 79   Resp 16   Ht 1.651 m (5' 5\")   Wt 88.5 kg (195 lb)   BMI 32.45 kg/m²     Gen: Appears well, NAD  Chest: CTA  CVS: regular without murmur or gallop  Ext: no edema, nontender  Skin: good condition without wounds or lesions  Mood and affect appropriate    ADDITIONAL DATA:   No compression worn today. Right calf: 37.0cm  Left calf:  36.5cm    CONCLUSIONS:   1. Left ventricular systolic function is normal with a 60-65% estimated ejection fraction.     ASSESSMENT/PLAN:    here for follow up of SMA dissection - discussed the need for the aspirin, atorvastatin, and lisinopril. Renewed again today. Discussed CTA to look for healing or degeneration. Follow up 6 months.   "

## 2024-07-11 ENCOUNTER — APPOINTMENT (OUTPATIENT)
Dept: BEHAVIORAL HEALTH | Facility: CLINIC | Age: 54
End: 2024-07-11
Payer: COMMERCIAL

## 2024-07-11 DIAGNOSIS — F31.9 BIPOLAR AND RELATED DISORDER (MULTI): ICD-10-CM

## 2024-07-11 PROCEDURE — 99213 OFFICE O/P EST LOW 20 MIN: CPT | Performed by: PSYCHIATRY & NEUROLOGY

## 2024-07-11 NOTE — PROGRESS NOTES
Subjective   Patient ID: Adán Liu is a 54 y.o. male who presents for No chief complaint on file. I am doing pretty good.     HPI: The patient reports he is doing well and is working 7 days a week.     Past Medical History:  11/18/2022: Personal history of other mental and behavioral disorders      Comment:  History of major depression    MSE: The patient is alert, fully oriented, language is intact, and recent and remote memory intact. The patient denies any suicidal or homicidal ideation or plans. The patient presents with no depressive, manic or psychotic symptoms. Thought is logical and clear. No disturbances of judgment or insight are exhibited. No behavioral disturbances are present on examination.        Review of Systems   Neurological:         The patient is alert, fully oriented, language is intact, and recent and remote memory intact. The patient denies any suicidal or homicidal ideation or plans. The patient presents with no depressive, manic or psychotic symptoms. Thought is logical and clear. No disturbances of judgment or insight are exhibited. No behavioral disturbances are present on examination.       Psychiatric/Behavioral:          The patient is alert, fully oriented, language is intact, and recent and remote memory intact. The patient denies any suicidal or homicidal ideation or plans. The patient presents with no depressive, manic or psychotic symptoms. Thought is logical and clear. No disturbances of judgment or insight are exhibited. No behavioral disturbances are present on examination.       All other systems reviewed and are negative.      Objective   Physical Exam  Constitutional:       Appearance: Normal appearance.   Neurological:      General: No focal deficit present.      Mental Status: He is alert and oriented to person, place, and time. Mental status is at baseline.      Comments: The patient is alert, fully oriented, language is intact, and recent and remote memory intact.  The patient denies any suicidal or homicidal ideation or plans. The patient presents with no depressive, manic or psychotic symptoms. Thought is logical and clear. No disturbances of judgment or insight are exhibited. No behavioral disturbances are present on examination.       Psychiatric:         Mood and Affect: Mood normal.         Behavior: Behavior normal.         Thought Content: Thought content normal.         Judgment: Judgment normal.      Comments: The patient is alert, fully oriented, language is intact, and recent and remote memory intact. The patient denies any suicidal or homicidal ideation or plans. The patient presents with no depressive, manic or psychotic symptoms. Thought is logical and clear. No disturbances of judgment or insight are exhibited. No behavioral disturbances are present on examination.             Lab Review:       Assessment/Plan    Psychiatric Risk Assessment  Violence Risk Assessment: none  Acute Risk of Harm to Others is Considered: low   Suicide Risk Assessment: age > 50 yrs old and   Protective Factors against Suicide: adherence to  treatment, fear of suicide, moral objections to suicide, positive family relationships, and sense of responsibility toward family  Acute Risk of Harm to Self is Considered: low    Diagnosis: anxiety and depression and attention deficit disorder all stable.    Treatment Plan:   The FDA risks, benefits & alternatives to the medications prescribed were explained to you today. You were able to understand & repeat these risks, benefits & alternatives to these prescribed medications. You have agreed to proceed with treatment with the medications discussed based on the conclusion that the benefit outweighs the risks of this treatment regimen: continue aripiprazole 5 mg daily, duloxetine 60 mg daily, and methylphenidate 20 mg in the morning before work and in the early afternoon daily.     Controlled substance follow up:   UDS: The latest urine  toxicology screen was on 12/13/23.  CSA: The stimulant controlled substance agreement was signed on 7/11/24.  OARRS: 7/11/24.    Follow up in October in person of 2024.       Psych Review of Symptoms:    ADHD:   Inattention Symptoms: Difficulty sustaining attention, difficulty with follow through, difficulty organizing, easily distracted and loses/misplaces belongings.       Anxiety: Patient denied any symptoms.         Developmental and Sensory Concerns: Patient denied any symptoms.         Depressive Symptoms: Patient denied any symptoms.         Disruptive and Conduct Symptoms: Patient denied any symptoms.         Eating / Feeding Concerns: Patient denied any symptoms.         Elimination Symptoms: Patient denied any symptoms.         Manic Symptoms: Patient denied any symptoms.         Obsessive-Compulsive Symptoms: Patient denied any symptoms.         Psychotic Symptoms: Patient denied any symptoms.           Trauma Related Symptoms: Patient denied any symptoms.           Sleep Concerns: Patient denied any symptoms.

## 2024-07-23 ENCOUNTER — LAB (OUTPATIENT)
Dept: LAB | Facility: LAB | Age: 54
End: 2024-07-23
Payer: COMMERCIAL

## 2024-07-23 DIAGNOSIS — I77.79 DISSECTION OF MESENTERIC ARTERY (MULTI): ICD-10-CM

## 2024-07-23 DIAGNOSIS — F90.0 ATTENTION DEFICIT HYPERACTIVITY DISORDER (ADHD), PREDOMINANTLY INATTENTIVE TYPE: ICD-10-CM

## 2024-07-23 DIAGNOSIS — F98.8 ATTENTION DEFICIT DISORDER (ADD) WITHOUT HYPERACTIVITY: ICD-10-CM

## 2024-07-23 LAB
ALBUMIN SERPL BCP-MCNC: 4.2 G/DL (ref 3.4–5)
ANION GAP SERPL CALC-SCNC: 13 MMOL/L (ref 10–20)
BUN SERPL-MCNC: 14 MG/DL (ref 6–23)
CALCIUM SERPL-MCNC: 9.2 MG/DL (ref 8.6–10.6)
CHLORIDE SERPL-SCNC: 101 MMOL/L (ref 98–107)
CO2 SERPL-SCNC: 31 MMOL/L (ref 21–32)
CREAT SERPL-MCNC: 0.98 MG/DL (ref 0.5–1.3)
EGFRCR SERPLBLD CKD-EPI 2021: >90 ML/MIN/1.73M*2
GLUCOSE SERPL-MCNC: 105 MG/DL (ref 74–99)
PHOSPHATE SERPL-MCNC: 3.4 MG/DL (ref 2.5–4.9)
POTASSIUM SERPL-SCNC: 4.5 MMOL/L (ref 3.5–5.3)
SODIUM SERPL-SCNC: 140 MMOL/L (ref 136–145)

## 2024-07-23 PROCEDURE — 80069 RENAL FUNCTION PANEL: CPT

## 2024-07-23 PROCEDURE — 36415 COLL VENOUS BLD VENIPUNCTURE: CPT

## 2024-07-23 RX ORDER — METHYLPHENIDATE HYDROCHLORIDE 20 MG/1
20 TABLET ORAL 2 TIMES DAILY
Qty: 60 TABLET | Refills: 0 | Status: SHIPPED | OUTPATIENT
Start: 2024-07-23 | End: 2024-08-22

## 2024-07-25 ENCOUNTER — HOSPITAL ENCOUNTER (OUTPATIENT)
Dept: RADIOLOGY | Facility: CLINIC | Age: 54
Discharge: HOME | End: 2024-07-25
Payer: COMMERCIAL

## 2024-07-25 DIAGNOSIS — I77.79 DISSECTION OF MESENTERIC ARTERY (MULTI): ICD-10-CM

## 2024-07-25 PROCEDURE — 74174 CTA ABD&PLVS W/CONTRAST: CPT

## 2024-07-25 PROCEDURE — 2550000001 HC RX 255 CONTRASTS: Performed by: INTERNAL MEDICINE

## 2024-08-22 DIAGNOSIS — F90.0 ATTENTION DEFICIT HYPERACTIVITY DISORDER (ADHD), PREDOMINANTLY INATTENTIVE TYPE: ICD-10-CM

## 2024-08-22 DIAGNOSIS — F98.8 ATTENTION DEFICIT DISORDER (ADD) WITHOUT HYPERACTIVITY: ICD-10-CM

## 2024-08-22 RX ORDER — METHYLPHENIDATE HYDROCHLORIDE 20 MG/1
20 TABLET ORAL 2 TIMES DAILY
Qty: 60 TABLET | Refills: 0 | Status: SHIPPED | OUTPATIENT
Start: 2024-08-22 | End: 2024-09-21

## 2024-09-20 DIAGNOSIS — F98.8 ATTENTION DEFICIT DISORDER (ADD) WITHOUT HYPERACTIVITY: ICD-10-CM

## 2024-09-20 RX ORDER — METHYLPHENIDATE HYDROCHLORIDE 20 MG/1
20 TABLET ORAL 2 TIMES DAILY
Qty: 60 TABLET | Refills: 0 | Status: SHIPPED | OUTPATIENT
Start: 2024-09-20 | End: 2024-10-20

## 2024-10-03 ENCOUNTER — APPOINTMENT (OUTPATIENT)
Dept: BEHAVIORAL HEALTH | Facility: CLINIC | Age: 54
End: 2024-10-03
Payer: COMMERCIAL

## 2024-10-03 ENCOUNTER — LAB (OUTPATIENT)
Dept: LAB | Facility: LAB | Age: 54
End: 2024-10-03
Payer: COMMERCIAL

## 2024-10-03 VITALS
WEIGHT: 187.5 LBS | SYSTOLIC BLOOD PRESSURE: 123 MMHG | BODY MASS INDEX: 31.24 KG/M2 | DIASTOLIC BLOOD PRESSURE: 76 MMHG | HEIGHT: 65 IN | HEART RATE: 78 BPM | RESPIRATION RATE: 18 BRPM | TEMPERATURE: 98.1 F

## 2024-10-03 DIAGNOSIS — F98.8 ATTENTION DEFICIT DISORDER (ADD) WITHOUT HYPERACTIVITY: ICD-10-CM

## 2024-10-03 DIAGNOSIS — Z79.899 MEDICATION MANAGEMENT: ICD-10-CM

## 2024-10-03 DIAGNOSIS — F31.9 BIPOLAR AND RELATED DISORDER (MULTI): ICD-10-CM

## 2024-10-03 LAB — AMPHETAMINES UR QL SCN: NORMAL

## 2024-10-03 PROCEDURE — 99213 OFFICE O/P EST LOW 20 MIN: CPT | Performed by: PSYCHIATRY & NEUROLOGY

## 2024-10-03 PROCEDURE — 3078F DIAST BP <80 MM HG: CPT | Performed by: PSYCHIATRY & NEUROLOGY

## 2024-10-03 PROCEDURE — 80307 DRUG TEST PRSMV CHEM ANLYZR: CPT

## 2024-10-03 PROCEDURE — 3074F SYST BP LT 130 MM HG: CPT | Performed by: PSYCHIATRY & NEUROLOGY

## 2024-10-03 PROCEDURE — 3008F BODY MASS INDEX DOCD: CPT | Performed by: PSYCHIATRY & NEUROLOGY

## 2024-10-03 RX ORDER — ARIPIPRAZOLE 5 MG/1
5 TABLET ORAL DAILY
Qty: 90 TABLET | Refills: 1 | Status: SHIPPED | OUTPATIENT
Start: 2024-10-03 | End: 2025-04-01

## 2024-10-03 RX ORDER — DULOXETIN HYDROCHLORIDE 60 MG/1
60 CAPSULE, DELAYED RELEASE ORAL DAILY
Qty: 90 CAPSULE | Refills: 1 | Status: SHIPPED | OUTPATIENT
Start: 2024-10-03 | End: 2025-04-01

## 2024-10-03 ASSESSMENT — PAIN SCALES - GENERAL: PAINLEVEL: 0-NO PAIN

## 2024-10-03 NOTE — PROGRESS NOTES
OARRS:  Jaylen Spann MD PhD on 10/3/2024  9:24 AM  I have personally reviewed the OARRS report for Adán Liu. I have considered the risks of abuse, dependence, addiction and diversion    Is the patient prescribed a combination of a benzodiazepine and opioid?  Yes, I feel it is clincially indicated to continue the medication and have discussed with the patient risks/benefits/alternatives.    Last Urine Drug Screen / ordered today: Yes 10/3/24  Recent Results (from the past 8760 hour(s))   Amphetamine Confirm, Urine    Collection Time: 12/13/23 10:18 AM   Result Value Ref Range    Methamphetamine Quant, Ur <200 ng/mL    MDA, Urine <200 ng/mL    MDEA, Urine <200 ng/mL    Phentermine,Urine <200 ng/mL    Amphetamines,Urine <50 ng/mL    MDMA, Urine <200 ng/mL     N/A        Controlled Substance Agreement:  Date of the Last Agreement: 7/11/24  Reviewed Controlled Substance Agreement including but not limited to the benefits, risks, and alternatives to treatment with a Controlled Substance medication(s).    Stimulants:   What is the patient's goal of therapy? Improve concentration and attention.   Is this being achieved with current treatment? Yes    Activities of Daily Living:   Is your overall impression that this patient is benefiting (symptom reduction outweighs side effects) from stimulant therapy? Yes     1. Physical Functioning: Same  2. Family Relationship: Better  3. Social Relationship: Better  4. Mood: Better  5. Sleep Patterns: Better  6. Overall Function: Better    Subjective   Patient ID: Adán Liu is a 54 y.o. male who presents for No chief complaint on file. I am doing pretty good.     HPI: The patient reports he is doing well and is working 7 days a week.     Past Medical History:  11/18/2022: Personal history of other mental and behavioral disorders      Comment:  History of major depression    MSE: The patient is alert, fully oriented, language is intact, and recent and remote memory intact.  The patient denies any suicidal or homicidal ideation or plans. The patient presents with no depressive, manic or psychotic symptoms. Thought is logical and clear. No disturbances of judgment or insight are exhibited. No behavioral disturbances are present on examination.        Review of Systems   Neurological:         The patient is alert, fully oriented, language is intact, and recent and remote memory intact. The patient denies any suicidal or homicidal ideation or plans. The patient presents with no depressive, manic or psychotic symptoms. Thought is logical and clear. No disturbances of judgment or insight are exhibited. No behavioral disturbances are present on examination.       Psychiatric/Behavioral:          The patient is alert, fully oriented, language is intact, and recent and remote memory intact. The patient denies any suicidal or homicidal ideation or plans. The patient presents with no depressive, manic or psychotic symptoms. Thought is logical and clear. No disturbances of judgment or insight are exhibited. No behavioral disturbances are present on examination.       All other systems reviewed and are negative.      Objective   Physical Exam  Constitutional:       Appearance: Normal appearance.   Neurological:      General: No focal deficit present.      Mental Status: He is alert and oriented to person, place, and time. Mental status is at baseline.      Comments: The patient is alert, fully oriented, language is intact, and recent and remote memory intact. The patient denies any suicidal or homicidal ideation or plans. The patient presents with no depressive, manic or psychotic symptoms. Thought is logical and clear. No disturbances of judgment or insight are exhibited. No behavioral disturbances are present on examination.       Psychiatric:         Mood and Affect: Mood normal.         Behavior: Behavior normal.         Thought Content: Thought content normal.         Judgment: Judgment  normal.      Comments: The patient is alert, fully oriented, language is intact, and recent and remote memory intact. The patient denies any suicidal or homicidal ideation or plans. The patient presents with no depressive, manic or psychotic symptoms. Thought is logical and clear. No disturbances of judgment or insight are exhibited. No behavioral disturbances are present on examination.             Lab Review:       Assessment/Plan    Psychiatric Risk Assessment  Violence Risk Assessment: none  Acute Risk of Harm to Others is Considered: low   Suicide Risk Assessment: age > 50 yrs old and   Protective Factors against Suicide: adherence to  treatment, fear of suicide, moral objections to suicide, positive family relationships, and sense of responsibility toward family  Acute Risk of Harm to Self is Considered: low    Diagnosis: anxiety and depression and attention deficit disorder all stable.    Treatment Plan:   The FDA risks, benefits & alternatives to the medications prescribed were explained to you today. You were able to understand & repeat these risks, benefits & alternatives to these prescribed medications. You have agreed to proceed with treatment with the medications discussed based on the conclusion that the benefit outweighs the risks of this treatment regimen: continue aripiprazole 5 mg daily, duloxetine 60 mg daily, and methylphenidate 20 mg in the morning before work and in the early afternoon daily.     Controlled substance follow up:   UDS: The latest urine toxicology screen was on 12/13/23. Ordered to be done today 10/3/24.  CSA: The stimulant controlled substance agreement was signed on 7/11/24.  OARRS: 10/3/24.    Follow up in January of 2025.       Psych Review of Symptoms:    ADHD:   Inattention Symptoms: Difficulty sustaining attention, difficulty with follow through, difficulty organizing, easily distracted and loses/misplaces belongings.       Anxiety: Patient denied any symptoms.          Developmental and Sensory Concerns: Patient denied any symptoms.         Depressive Symptoms: Patient denied any symptoms.         Disruptive and Conduct Symptoms: Patient denied any symptoms.         Eating / Feeding Concerns: Patient denied any symptoms.         Elimination Symptoms: Patient denied any symptoms.         Manic Symptoms: Patient denied any symptoms.         Obsessive-Compulsive Symptoms: Patient denied any symptoms.         Psychotic Symptoms: Patient denied any symptoms.           Trauma Related Symptoms: Patient denied any symptoms.           Sleep Concerns: Patient denied any symptoms.

## 2024-10-17 DIAGNOSIS — F98.8 ATTENTION DEFICIT DISORDER (ADD) WITHOUT HYPERACTIVITY: ICD-10-CM

## 2024-10-17 RX ORDER — METHYLPHENIDATE HYDROCHLORIDE 20 MG/1
20 TABLET ORAL 2 TIMES DAILY
Qty: 60 TABLET | Refills: 0 | Status: SHIPPED | OUTPATIENT
Start: 2024-10-18 | End: 2024-11-17

## 2024-11-18 DIAGNOSIS — F98.8 ATTENTION DEFICIT DISORDER (ADD) WITHOUT HYPERACTIVITY: ICD-10-CM

## 2024-11-18 RX ORDER — METHYLPHENIDATE HYDROCHLORIDE 20 MG/1
20 TABLET ORAL 2 TIMES DAILY
Qty: 60 TABLET | Refills: 0 | Status: SHIPPED | OUTPATIENT
Start: 2024-11-18 | End: 2024-11-19 | Stop reason: SDUPTHER

## 2024-11-19 DIAGNOSIS — F98.8 ATTENTION DEFICIT DISORDER (ADD) WITHOUT HYPERACTIVITY: ICD-10-CM

## 2024-11-19 RX ORDER — METHYLPHENIDATE HYDROCHLORIDE 20 MG/1
20 TABLET ORAL 2 TIMES DAILY
Qty: 60 TABLET | Refills: 0 | Status: SHIPPED | OUTPATIENT
Start: 2024-11-19 | End: 2024-12-19

## 2024-12-10 PROBLEM — F41.8 MIXED ANXIETY DEPRESSIVE DISORDER: Status: ACTIVE | Noted: 2024-12-10

## 2024-12-10 PROBLEM — K21.9 GASTROESOPHAGEAL REFLUX DISEASE: Status: ACTIVE | Noted: 2024-12-10

## 2024-12-12 ENCOUNTER — APPOINTMENT (OUTPATIENT)
Dept: PRIMARY CARE | Facility: CLINIC | Age: 54
End: 2024-12-12
Payer: COMMERCIAL

## 2024-12-12 VITALS
HEIGHT: 65 IN | TEMPERATURE: 97 F | DIASTOLIC BLOOD PRESSURE: 66 MMHG | SYSTOLIC BLOOD PRESSURE: 110 MMHG | OXYGEN SATURATION: 96 % | RESPIRATION RATE: 16 BRPM | HEART RATE: 60 BPM | BODY MASS INDEX: 31.16 KG/M2 | WEIGHT: 187 LBS

## 2024-12-12 DIAGNOSIS — Z23 ENCOUNTER FOR VACCINATION: ICD-10-CM

## 2024-12-12 DIAGNOSIS — N48.89 PENILE CYST: ICD-10-CM

## 2024-12-12 DIAGNOSIS — Z00.00 HEALTHCARE MAINTENANCE: Primary | ICD-10-CM

## 2024-12-12 PROCEDURE — 3074F SYST BP LT 130 MM HG: CPT

## 2024-12-12 PROCEDURE — 90471 IMMUNIZATION ADMIN: CPT

## 2024-12-12 PROCEDURE — 99203 OFFICE O/P NEW LOW 30 MIN: CPT

## 2024-12-12 PROCEDURE — 3008F BODY MASS INDEX DOCD: CPT

## 2024-12-12 PROCEDURE — 3078F DIAST BP <80 MM HG: CPT

## 2024-12-12 PROCEDURE — 1036F TOBACCO NON-USER: CPT

## 2024-12-12 PROCEDURE — 90656 IIV3 VACC NO PRSV 0.5 ML IM: CPT

## 2024-12-12 NOTE — PROGRESS NOTES
"Subjective   Patient ID: Adán Liu is a 54 y.o. male who presents for Establish Care (Establish care./No prev PCP. Previous PCP was over 20 years ago. He see a cardiologist, Dr Conn.  Also see Dr Spann in [facility].) and Cyst (He has 3 cyst . 1 on groin and 1 on back. The 3rd cyst he noticed 3 weeks ago. Denies pain or drainage. You can not see it. You can only feel it./He does not want to say where it is.).    Patient here today to establish care  Past medical history is significant for SMA dissection (follows with vascular surgery), hypertension, ADHD/anxiety (follows with psychiatry every 3 months)  Denies any intestinal angina symptoms at this time  Is compliant with all medications currently, is taking BP control, aspirin, statin that were started after diagnosis of SMA dissection.  Patient presenting today with concerns for penile mass.  States he noticed initially several weeks prior along right side of penis.  Does have several areas of subcutaneous masses palpable.  Feels area to be tender, has increasingly noticed.  States that is never felt mass in that area before.  Denies any scrotal symptoms, no discharge, no associated sexual symptoms the patient endorses.         Review of Systems    Objective   /66 (BP Location: Right arm, Patient Position: Sitting)   Pulse 60   Temp 36.1 °C (97 °F)   Resp 16   Ht 1.651 m (5' 5\")   Wt 84.8 kg (187 lb)   SpO2 96%   BMI 31.12 kg/m²     Physical Exam  Constitutional:       General: He is not in acute distress.     Appearance: Normal appearance. He is not ill-appearing.   Eyes:      Conjunctiva/sclera: Conjunctivae normal.   Cardiovascular:      Rate and Rhythm: Normal rate and regular rhythm.      Heart sounds: No murmur heard.     No friction rub. No gallop.   Pulmonary:      Effort: Pulmonary effort is normal. No respiratory distress.      Breath sounds: Normal breath sounds. No wheezing, rhonchi or rales.   Genitourinary:     Comments: " Area of concern along right side of penile shaft approximating to penile urethra.  Difficulty in discerning area of concern.  To this author overall felt symmetric bilateral sides and normal urethral anatomy.  No scrotal abnormalities notable, no inguinal lymphadenopathy.  Neurological:      General: No focal deficit present.      Mental Status: He is alert and oriented to person, place, and time.   Psychiatric:         Mood and Affect: Mood normal.         Behavior: Behavior normal.         Assessment/Plan   Problem List Items Addressed This Visit    None  Visit Diagnoses         Codes    Healthcare maintenance    -  Primary Z00.00    Relevant Orders    Vitamin D 25-Hydroxy,Total (for eval of Vitamin D levels)    Comprehensive Metabolic Panel    TSH with reflex to Free T4 if abnormal    Lipid Panel    CBC and Auto Differential    Penile cyst     N48.89    Relevant Orders    Referral to Urology    Encounter for vaccination     Z23    Relevant Orders    Flu vaccine, trivalent, preservative free, age 6 months and greater (Fluraix/Fluzone/Flulaval) (Completed)        Will refer patient at this time to urology given penile concerns.  Do not feel acute imaging warranted as had difficulty in evaluating area of concern.  Ordered basic labs which will be completed prior to patient follow-up for annual physical.  Recommend patient return to clinic at time for annual physical, sooner if labs warrant more immediate evaluation.    Alvino Castillo, DO

## 2024-12-18 DIAGNOSIS — F98.8 ATTENTION DEFICIT DISORDER (ADD) WITHOUT HYPERACTIVITY: ICD-10-CM

## 2024-12-18 RX ORDER — METHYLPHENIDATE HYDROCHLORIDE 20 MG/1
20 TABLET ORAL 2 TIMES DAILY
Qty: 60 TABLET | Refills: 0 | Status: SHIPPED | OUTPATIENT
Start: 2024-12-18 | End: 2025-01-17

## 2025-01-07 ENCOUNTER — APPOINTMENT (OUTPATIENT)
Dept: UROLOGY | Facility: CLINIC | Age: 55
End: 2025-01-07
Payer: COMMERCIAL

## 2025-01-07 VITALS
TEMPERATURE: 98 F | DIASTOLIC BLOOD PRESSURE: 73 MMHG | HEART RATE: 79 BPM | SYSTOLIC BLOOD PRESSURE: 114 MMHG | RESPIRATION RATE: 25 BRPM

## 2025-01-07 DIAGNOSIS — N48.89 PENILE CYST: ICD-10-CM

## 2025-01-07 DIAGNOSIS — N48.89 PENILE MASS: Primary | ICD-10-CM

## 2025-01-07 LAB
POC APPEARANCE, URINE: CLEAR
POC BILIRUBIN, URINE: NEGATIVE
POC BLOOD, URINE: NEGATIVE
POC COLOR, URINE: YELLOW
POC GLUCOSE, URINE: NEGATIVE MG/DL
POC KETONES, URINE: NEGATIVE MG/DL
POC LEUKOCYTES, URINE: NEGATIVE
POC NITRITE,URINE: NEGATIVE
POC PH, URINE: 6 PH
POC PROTEIN, URINE: NEGATIVE MG/DL
POC SPECIFIC GRAVITY, URINE: >=1.03
POC UROBILINOGEN, URINE: 0.2 EU/DL

## 2025-01-07 PROCEDURE — 1036F TOBACCO NON-USER: CPT | Performed by: NURSE PRACTITIONER

## 2025-01-07 PROCEDURE — 99203 OFFICE O/P NEW LOW 30 MIN: CPT | Performed by: NURSE PRACTITIONER

## 2025-01-07 PROCEDURE — 3078F DIAST BP <80 MM HG: CPT | Performed by: NURSE PRACTITIONER

## 2025-01-07 PROCEDURE — 51798 US URINE CAPACITY MEASURE: CPT | Performed by: NURSE PRACTITIONER

## 2025-01-07 PROCEDURE — 81003 URINALYSIS AUTO W/O SCOPE: CPT | Performed by: NURSE PRACTITIONER

## 2025-01-07 PROCEDURE — 3074F SYST BP LT 130 MM HG: CPT | Performed by: NURSE PRACTITIONER

## 2025-01-07 NOTE — PROGRESS NOTES
UROLOGIC INITIAL EVALUATION     PROBLEM LIST:  1. Penile mass        2. Penile cyst  Referral to Urology    Post-Void Residual    POCT UA Automated manually resulted    US scrotum           HISTORY OF PRESENT ILLNESS:   Adán Liu is a 54 y.o. with hx SMA dissection, HTN, ADHD  Kindly referred by PCP for evaluation of penile cyst  Seen unaccompanied  Reports bump at base of penis  Uncertain how long it's been there, estimates 6 months  Does not recall any precipitating event  Painless, not associated with any curvature  No change in sexual function  No change in urinary function  No hematuria or dysuria  No STI exposure    PAST MEDICAL HISTORY:  Past Medical History:   Diagnosis Date    Depression 1991    GERD (gastroesophageal reflux disease) 2000    Personal history of other mental and behavioral disorders 11/18/2022    History of major depression       PAST SURGICAL HISTORY:  Past Surgical History:   Procedure Laterality Date    CT ABDOMEN PELVIS ANGIOGRAM W AND/OR WO IV CONTRAST  9/24/2023    CT ABDOMEN PELVIS ANGIOGRAM W AND/OR WO IV CONTRAST 9/24/2023 [facility] CT        ALLERGIES:   Allergies   Allergen Reactions    Latex Hives        MEDICATIONS:   Current Outpatient Medications on File Prior to Visit   Medication Sig Dispense Refill    ARIPiprazole (Abilify) 5 mg tablet Take 1 tablet (5 mg) by mouth once daily. 90 tablet 1    aspirin 81 mg EC tablet TAKE 1 TABLET BY MOUTH ONCE DAILY. 90 tablet 3    atorvastatin (Lipitor) 40 mg tablet Take 1 tablet (40 mg) by mouth once daily. 90 tablet 3    DULoxetine (Cymbalta) 60 mg DR capsule Take 1 capsule (60 mg) by mouth once daily. Do not crush or chew. 90 capsule 1    lisinopril 10 mg tablet TAKE 1 TABLET BY MOUTH ONCE DAILY. 90 tablet 3    methylphenidate (Ritalin) 20 mg tablet Take 1 tablet (20 mg) by mouth 2 times a day. 60 tablet 0    omeprazole OTC (PriLOSEC OTC) 20 mg EC tablet Take 1 tablet (20 mg) by mouth once daily in the morning. Take before meals. Do not  crush, chew, or split.       No current facility-administered medications on file prior to visit.        SOCIAL HISTORY:  Patient  reports that he has never smoked. He has never used smokeless tobacco. He reports that he does not currently use alcohol. He reports that he does not use drugs.   Social History     Socioeconomic History    Marital status: Single     Spouse name: Not on file    Number of children: Not on file    Years of education: Not on file    Highest education level: Not on file   Occupational History    Not on file   Tobacco Use    Smoking status: Never    Smokeless tobacco: Never   Substance and Sexual Activity    Alcohol use: Not Currently     Comment: Less than 1 drink per month    Drug use: Never    Sexual activity: Not Currently     Partners: Female   Other Topics Concern    Not on file   Social History Narrative    Not on file     Social Drivers of Health     Financial Resource Strain: Low Risk  (1/24/2024)    Overall Financial Resource Strain (CARDIA)     Difficulty of Paying Living Expenses: Not hard at all   Food Insecurity: No Food Insecurity (1/24/2024)    Hunger Vital Sign     Worried About Running Out of Food in the Last Year: Never true     Ran Out of Food in the Last Year: Never true   Transportation Needs: No Transportation Needs (1/24/2024)    PRAPARE - Transportation     Lack of Transportation (Medical): No     Lack of Transportation (Non-Medical): No   Physical Activity: Sufficiently Active (1/24/2024)    Exercise Vital Sign     Days of Exercise per Week: 3 days     Minutes of Exercise per Session: 60 min   Stress: No Stress Concern Present (1/24/2024)    [place] [employer] - Occupational Stress Questionnaire     Feeling of Stress : Only a little   Social Connections: Moderately Isolated (1/24/2024)    Social Connection and Isolation Panel [NHANES]     Frequency of Communication with Friends and Family: More than three times a week     Frequency of Social  Gatherings with Friends and Family: More than three times a week     Attends Taoism Services: Never     Active Member of Clubs or Organizations: Yes     Attends Club or Organization Meetings: More than 4 times per year     Marital Status: Never    Intimate Partner Violence: Not At Risk (1/24/2024)    Humiliation, Afraid, Rape, and Kick questionnaire     Fear of Current or Ex-Partner: No     Emotionally Abused: No     Physically Abused: No     Sexually Abused: No   Housing Stability: Low Risk  (1/24/2024)    Housing Stability Vital Sign     Unable to Pay for Housing in the Last Year: No     Number of Places Lived in the Last Year: 1     Unstable Housing in the Last Year: No       FAMILY HISTORY:  Family History   Problem Relation Name Age of Onset    Diabetes Father Franklin Liu     Heart disease Father Franklin Liu     Hernia Father Franklin Liu     Hyperlipidemia Father Franklin Liu     Cancer Maternal Grandmother Teresa Grimm     Cancer Mother's Sister Delaney Lepe        REVIEW OF SYSTEMS:  All systems reviewed, pertinent negatives as noted in HPI.     PHYSICAL EXAM:  Visit Vitals  /73   Pulse 79   Temp 36.7 °C (98 °F) (Temporal)   Resp 25     Constitutional: Well-developed and well-nourished. No distress.    Head: Normocephalic and atraumatic.    Neck: Normal range of motion.     Pulmonary/Chest: Effort normal. No respiratory distress.   Abdominal: Non-distended.  : See below.  Integumentary: No rash or lesions visualized.  Musculoskeletal: Normal range of motion.    Neurological: Alert, grossly intact.  Psychiatric: Normal mood and affect. Thought content normal.      LABORATORY REVIEW:   Lab Results   Component Value Date    BUN 14 07/23/2024    CREATININE 0.98 07/23/2024    EGFR >90 07/23/2024     07/23/2024    K 4.5 07/23/2024     07/23/2024    CO2 31 07/23/2024    CALCIUM 9.2 07/23/2024      Lab Results   Component Value Date    WBC 7.6 09/26/2023    RBC 5.05 09/26/2023     "HGB 14.5 09/26/2023    HCT 44.7 09/26/2023    MCV 89 09/26/2023    MCHC 32.4 09/26/2023    RDW 13.2 09/26/2023     09/26/2023        No results found for: \"PSA\"          Assessment:      1. Penile mass        2. Penile cyst  Referral to Urology    Post-Void Residual    POCT UA Automated manually resulted    US scrotum          Adán Liu is a 54 y.o. with painless penile mass x ?6 months  UA today negative, emptying bladder well  On exam, there is a very subtle fullness at R base of penis that appears to correspond with a superficial blood vessel  No tenderness or fluctuance  No associated rash or lesion of overlying skin    Discussed possible varicosity, most likely benign in nature  Agreeable to plan as below      Plan:   US scrotum to include base of phallus  If imaging confirms varicosity or other benign finding, no further workup needed; if not, will consider referral to one of our men's sexual health colleagues   Encouraged to contact us in the interim with any questions, concerns       "

## 2025-01-07 NOTE — LETTER
January 7, 2025     Alvino Castillo DO  [id number] [facility]  [address]  [facility] [address]    Patient: Adán Liu   YOB: 1970   Date of Visit: 1/7/2025       Dear Dr. Alvino Castillo DO:    Thank you for referring Adán Liu to me for evaluation. Below are my notes for this consultation.  If you have questions, please do not hesitate to call me. I look forward to following your patient along with you.       Sincerely,     Denisse Correa, APRN-CNP      CC: No Recipients  ______________________________________________________________________________________    UROLOGIC INITIAL EVALUATION     PROBLEM LIST:  1. Penile mass        2. Penile cyst  Referral to Urology    Post-Void Residual    POCT UA Automated manually resulted    US scrotum           HISTORY OF PRESENT ILLNESS:   Adán Liu is a 54 y.o. with hx SMA dissection, HTN, ADHD  Kindly referred by PCP for evaluation of penile cyst  Seen unaccompanied  Reports bump at base of penis  Uncertain how long it's been there, estimates 6 months  Does not recall any precipitating event  Painless, not associated with any curvature  No change in sexual function  No change in urinary function  No hematuria or dysuria  No STI exposure    PAST MEDICAL HISTORY:  Past Medical History:   Diagnosis Date   • Depression 1991   • GERD (gastroesophageal reflux disease) 2000   • Personal history of other mental and behavioral disorders 11/18/2022    History of major depression       PAST SURGICAL HISTORY:  Past Surgical History:   Procedure Laterality Date   • CT ABDOMEN PELVIS ANGIOGRAM W AND/OR WO IV CONTRAST  9/24/2023    CT ABDOMEN PELVIS ANGIOGRAM W AND/OR WO IV CONTRAST 9/24/2023 U CT        ALLERGIES:   Allergies   Allergen Reactions   • Latex Hives        MEDICATIONS:   Current Outpatient Medications on File Prior to Visit   Medication Sig Dispense Refill   • ARIPiprazole (Abilify) 5 mg tablet Take 1 tablet (5 mg) by mouth once daily. 90 tablet 1   •  aspirin 81 mg EC tablet TAKE 1 TABLET BY MOUTH ONCE DAILY. 90 tablet 3   • atorvastatin (Lipitor) 40 mg tablet Take 1 tablet (40 mg) by mouth once daily. 90 tablet 3   • DULoxetine (Cymbalta) 60 mg DR capsule Take 1 capsule (60 mg) by mouth once daily. Do not crush or chew. 90 capsule 1   • lisinopril 10 mg tablet TAKE 1 TABLET BY MOUTH ONCE DAILY. 90 tablet 3   • methylphenidate (Ritalin) 20 mg tablet Take 1 tablet (20 mg) by mouth 2 times a day. 60 tablet 0   • omeprazole OTC (PriLOSEC OTC) 20 mg EC tablet Take 1 tablet (20 mg) by mouth once daily in the morning. Take before meals. Do not crush, chew, or split.       No current facility-administered medications on file prior to visit.        SOCIAL HISTORY:  Patient  reports that he has never smoked. He has never used smokeless tobacco. He reports that he does not currently use alcohol. He reports that he does not use drugs.   Social History     Socioeconomic History   • Marital status: Single     Spouse name: Not on file   • Number of children: Not on file   • Years of education: Not on file   • Highest education level: Not on file   Occupational History   • Not on file   Tobacco Use   • Smoking status: Never   • Smokeless tobacco: Never   Substance and Sexual Activity   • Alcohol use: Not Currently     Comment: Less than 1 drink per month   • Drug use: Never   • Sexual activity: Not Currently     Partners: Female   Other Topics Concern   • Not on file   Social History Narrative   • Not on file     Social Drivers of Health     Financial Resource Strain: Low Risk  (1/24/2024)    Overall Financial Resource Strain (CARDIA)    • Difficulty of Paying Living Expenses: Not hard at all   Food Insecurity: No Food Insecurity (1/24/2024)    Hunger Vital Sign    • Worried About Running Out of Food in the Last Year: Never true    • Ran Out of Food in the Last Year: Never true   Transportation Needs: No Transportation Needs (1/24/2024)    PRAPARE - Transportation    • Lack of  Transportation (Medical): No    • Lack of Transportation (Non-Medical): No   Physical Activity: Sufficiently Active (1/24/2024)    Exercise Vital Sign    • Days of Exercise per Week: 3 days    • Minutes of Exercise per Session: 60 min   Stress: No Stress Concern Present (1/24/2024)    [employer] [place] [employer] - Occupational Stress Questionnaire    • Feeling of Stress : Only a little   Social Connections: Moderately Isolated (1/24/2024)    Social Connection and Isolation Panel [NHANES]    • Frequency of Communication with Friends and Family: More than three times a week    • Frequency of Social Gatherings with Friends and Family: More than three times a week    • Attends Evangelical Services: Never    • Active Member of Clubs or Organizations: Yes    • Attends Club or Organization Meetings: More than 4 times per year    • Marital Status: Never    Intimate Partner Violence: Not At Risk (1/24/2024)    Humiliation, Afraid, Rape, and Kick questionnaire    • Fear of Current or Ex-Partner: No    • Emotionally Abused: No    • Physically Abused: No    • Sexually Abused: No   Housing Stability: Low Risk  (1/24/2024)    Housing Stability Vital Sign    • Unable to Pay for Housing in the Last Year: No    • Number of Places Lived in the Last Year: 1    • Unstable Housing in the Last Year: No       FAMILY HISTORY:  Family History   Problem Relation Name Age of Onset   • Diabetes Father Franklin Liu    • Heart disease Father Franklin Liu    • Hernia Father Franklin Liu    • Hyperlipidemia Father Franklin Liu    • Cancer Maternal Grandmother Teresa Grimm    • Cancer Mother's Sister Delaney Lepe        REVIEW OF SYSTEMS:  All systems reviewed, pertinent negatives as noted in HPI.     PHYSICAL EXAM:  Visit Vitals  /73   Pulse 79   Temp 36.7 °C (98 °F) (Temporal)   Resp 25     Constitutional: Well-developed and well-nourished. No distress.    Head: Normocephalic and atraumatic.    Neck: Normal range of  "motion.     Pulmonary/Chest: Effort normal. No respiratory distress.   Abdominal: Non-distended.  : See below.  Integumentary: No rash or lesions visualized.  Musculoskeletal: Normal range of motion.    Neurological: Alert, grossly intact.  Psychiatric: Normal mood and affect. Thought content normal.      LABORATORY REVIEW:   Lab Results   Component Value Date    BUN 14 07/23/2024    CREATININE 0.98 07/23/2024    EGFR >90 07/23/2024     07/23/2024    K 4.5 07/23/2024     07/23/2024    CO2 31 07/23/2024    CALCIUM 9.2 07/23/2024      Lab Results   Component Value Date    WBC 7.6 09/26/2023    RBC 5.05 09/26/2023    HGB 14.5 09/26/2023    HCT 44.7 09/26/2023    MCV 89 09/26/2023    MCHC 32.4 09/26/2023    RDW 13.2 09/26/2023     09/26/2023        No results found for: \"PSA\"          Assessment:      1. Penile mass        2. Penile cyst  Referral to Urology    Post-Void Residual    POCT UA Automated manually resulted    US scrotum          Adán Liu is a 54 y.o. with painless penile mass x ?6 months  UA today negative, emptying bladder well  On exam, there is a very subtle fullness at R base of penis that appears to correspond with a superficial blood vessel  No tenderness or fluctuance  No associated rash or lesion of overlying skin    Discussed possible varicosity, most likely benign in nature  Agreeable to plan as below      Plan:   US scrotum to include base of phallus  If imaging confirms varicosity or other benign finding, no further workup needed; if not, will consider referral to one of our men's sexual health colleagues   Encouraged to contact us in the interim with any questions, concerns     "

## 2025-01-08 ENCOUNTER — OFFICE VISIT (OUTPATIENT)
Dept: CARDIOLOGY | Facility: CLINIC | Age: 55
End: 2025-01-08
Payer: COMMERCIAL

## 2025-01-08 ENCOUNTER — LAB (OUTPATIENT)
Dept: LAB | Facility: LAB | Age: 55
End: 2025-01-08
Payer: COMMERCIAL

## 2025-01-08 VITALS
SYSTOLIC BLOOD PRESSURE: 117 MMHG | HEART RATE: 72 BPM | HEIGHT: 65 IN | BODY MASS INDEX: 32.46 KG/M2 | DIASTOLIC BLOOD PRESSURE: 76 MMHG | WEIGHT: 194.8 LBS | RESPIRATION RATE: 18 BRPM

## 2025-01-08 DIAGNOSIS — I10 PRIMARY HYPERTENSION: ICD-10-CM

## 2025-01-08 DIAGNOSIS — Z00.00 HEALTHCARE MAINTENANCE: ICD-10-CM

## 2025-01-08 DIAGNOSIS — I77.79 DISSECTION OF MESENTERIC ARTERY (MULTI): Primary | ICD-10-CM

## 2025-01-08 LAB
25(OH)D3 SERPL-MCNC: 16 NG/ML (ref 30–100)
ALBUMIN SERPL BCP-MCNC: 4.2 G/DL (ref 3.4–5)
ALP SERPL-CCNC: 115 U/L (ref 33–120)
ALT SERPL W P-5'-P-CCNC: 25 U/L (ref 10–52)
ANION GAP SERPL CALC-SCNC: 14 MMOL/L (ref 10–20)
AST SERPL W P-5'-P-CCNC: 13 U/L (ref 9–39)
BASOPHILS # BLD AUTO: 0.1 X10*3/UL (ref 0–0.1)
BASOPHILS NFR BLD AUTO: 1.3 %
BILIRUB SERPL-MCNC: 0.5 MG/DL (ref 0–1.2)
BUN SERPL-MCNC: 17 MG/DL (ref 6–23)
CALCIUM SERPL-MCNC: 9.7 MG/DL (ref 8.6–10.6)
CHLORIDE SERPL-SCNC: 102 MMOL/L (ref 98–107)
CHOLEST SERPL-MCNC: 272 MG/DL (ref 0–199)
CHOLESTEROL/HDL RATIO: 7
CO2 SERPL-SCNC: 27 MMOL/L (ref 21–32)
CREAT SERPL-MCNC: 0.97 MG/DL (ref 0.5–1.3)
EGFRCR SERPLBLD CKD-EPI 2021: >90 ML/MIN/1.73M*2
EOSINOPHIL # BLD AUTO: 0.17 X10*3/UL (ref 0–0.7)
EOSINOPHIL NFR BLD AUTO: 2.2 %
ERYTHROCYTE [DISTWIDTH] IN BLOOD BY AUTOMATED COUNT: 13.1 % (ref 11.5–14.5)
GLUCOSE SERPL-MCNC: 99 MG/DL (ref 74–99)
HCT VFR BLD AUTO: 42 % (ref 41–52)
HDLC SERPL-MCNC: 39.1 MG/DL
HGB BLD-MCNC: 14 G/DL (ref 13.5–17.5)
IMM GRANULOCYTES # BLD AUTO: 0.04 X10*3/UL (ref 0–0.7)
IMM GRANULOCYTES NFR BLD AUTO: 0.5 % (ref 0–0.9)
LDLC SERPL CALC-MCNC: 200 MG/DL
LYMPHOCYTES # BLD AUTO: 2.16 X10*3/UL (ref 1.2–4.8)
LYMPHOCYTES NFR BLD AUTO: 27.6 %
MCH RBC QN AUTO: 29.2 PG (ref 26–34)
MCHC RBC AUTO-ENTMCNC: 33.3 G/DL (ref 32–36)
MCV RBC AUTO: 88 FL (ref 80–100)
MONOCYTES # BLD AUTO: 0.55 X10*3/UL (ref 0.1–1)
MONOCYTES NFR BLD AUTO: 7 %
NEUTROPHILS # BLD AUTO: 4.8 X10*3/UL (ref 1.2–7.7)
NEUTROPHILS NFR BLD AUTO: 61.4 %
NON HDL CHOLESTEROL: 233 MG/DL (ref 0–149)
NRBC BLD-RTO: 0 /100 WBCS (ref 0–0)
PLATELET # BLD AUTO: 237 X10*3/UL (ref 150–450)
POTASSIUM SERPL-SCNC: 4.2 MMOL/L (ref 3.5–5.3)
PROT SERPL-MCNC: 6.4 G/DL (ref 6.4–8.2)
RBC # BLD AUTO: 4.79 X10*6/UL (ref 4.5–5.9)
SODIUM SERPL-SCNC: 139 MMOL/L (ref 136–145)
TRIGL SERPL-MCNC: 164 MG/DL (ref 0–149)
TSH SERPL-ACNC: 1.87 MIU/L (ref 0.44–3.98)
VLDL: 33 MG/DL (ref 0–40)
WBC # BLD AUTO: 7.8 X10*3/UL (ref 4.4–11.3)

## 2025-01-08 PROCEDURE — 3078F DIAST BP <80 MM HG: CPT | Performed by: INTERNAL MEDICINE

## 2025-01-08 PROCEDURE — 3008F BODY MASS INDEX DOCD: CPT | Performed by: INTERNAL MEDICINE

## 2025-01-08 PROCEDURE — 99214 OFFICE O/P EST MOD 30 MIN: CPT | Performed by: INTERNAL MEDICINE

## 2025-01-08 PROCEDURE — 1036F TOBACCO NON-USER: CPT | Performed by: INTERNAL MEDICINE

## 2025-01-08 PROCEDURE — 3074F SYST BP LT 130 MM HG: CPT | Performed by: INTERNAL MEDICINE

## 2025-01-08 ASSESSMENT — PAIN SCALES - GENERAL: PAINLEVEL_OUTOF10: 0-NO PAIN

## 2025-01-08 NOTE — PATIENT INSTRUCTIONS
ASSESSMENT/PLAN:    here for follow up SMA dissection. Continue the requested meds - ASA, lisinopril and atorvastatin. Discussed avoiding heavy weight lifting. CT from July 2024 demonstrates healing. Follow up annually.

## 2025-01-08 NOTE — PROGRESS NOTES
"OUTPATIENT FOLLOW-UP -  VASCULAR MEDICINE    DOS: 2025  Last seen:    7/10/2024    [facility]ING PHYSICIAN[facility]    REASON FOR FOLLOW-UP:  here for follow up SMA dissection    HISTORY OF PRESENT ILLNESS:     55 yo man here for follow up SMA dissection. Resumed the requested meds - ASA, lisinopril and atorvastatin. Continues to do his \"base, burn and build\" classes. Denies heavy lifting associated with this. Has established with a PCP. No ABD pain or post-prandial pain.     Had  CTA in July:  Vascular:  No aortic aneurysm or dissection. The celiac artery and its major branches are widely patent. The SMA demonstrates improved but persistent mild wall thickening that results in mild stenosis. There is also improved appearance of previously seen wall thickening involving jejunal trunk off the SMA. There is no new dissection or occlusion. The ELENA and bilateral renal arteries are widely patent.     From last note:   [place] [facility] [place]. CT c/w SMA dissection. Was at a  and developed sudden onset ABD pain. No preceding trauma but was a \"very emotional\" experience. +associated diaphoresis and emesis x1. CTAP - dissection of the SMA 5.8 cm from the OS with multiple proximal branches with decreased opacification. Rx heparin gtt and [place] [facility] [place] is a never smoker. During admission seen by [facility] recommended DAPT, atorvastatin and lisinopril. DC home with atorvastatin 10 mg (?). Did not keep initial follow up - has been out of ASA, clopidogrel, lisinopril and atorvastatin - only had a 30 day supply. Seen recently by Dr. Ortiz and recommended to resume the lisinopril and ASA - but no Rx was sent. Also referred for PCP.       Last imaging 2023:  CONCLUSIONS:  Mesenteric: Celiac artery demonstrates no evidence of hemodynamically significant stenosis, SMA demonstrates no evidence of hemodynamically significant stenosis, Hepatic artery appears widely patent and the ELENA " "appears widely patent. Mild wall irregularity noted within superior mesenteric artery. Unable to adequately visualize splenic artery due to overlying bowel gas.  Additional Findings:  Significant overlying bowel gas.     REVIEW OF SYSTEMS:     weight is stable  No sores, ulcers, rashes, skin lesions  No CP, chest pressure  No cough, wheezing, SOB  No BRBPR, melena, hematuria  No bleeding  No edema, no calf pain    PHYSICAL EXAMINATION:   /76 (BP Location: Right arm, Patient Position: Sitting)   Pulse 72   Resp 18   Ht 1.651 m (5' 5\")   Wt 88.4 kg (194 lb 12.8 oz)   BMI 32.42 kg/m²     Gen: Appears well, NAD  Chest: CTA  CVS: regular without murmur;  +gallop - S4  Abd: soft, NT/ND, no bruits,   Ext: no edema, nontender  Skin: good condition without wounds or lesions  Pulses: DP 2+;   Mood and affect appropriate    ADDITIONAL DATA:   No compression worn today. Right calf: 37.0cm Left calf:  37.0cm    ASSESSMENT/PLAN:    here for follow up SMA dissection. Continue the requested meds - ASA, lisinopril and atorvastatin. Discussed avoiding heavy weight lifting. CT from July 2024 demonstrates healing. Follow up annually.  "

## 2025-01-14 ENCOUNTER — APPOINTMENT (OUTPATIENT)
Dept: PRIMARY CARE | Facility: CLINIC | Age: 55
End: 2025-01-14
Payer: COMMERCIAL

## 2025-01-14 VITALS
SYSTOLIC BLOOD PRESSURE: 116 MMHG | BODY MASS INDEX: 31.99 KG/M2 | OXYGEN SATURATION: 98 % | HEIGHT: 65 IN | TEMPERATURE: 97.2 F | DIASTOLIC BLOOD PRESSURE: 62 MMHG | RESPIRATION RATE: 16 BRPM | WEIGHT: 192 LBS | HEART RATE: 78 BPM

## 2025-01-14 DIAGNOSIS — E78.2 MIXED HYPERLIPIDEMIA: ICD-10-CM

## 2025-01-14 DIAGNOSIS — E55.9 VITAMIN D DEFICIENCY: Primary | ICD-10-CM

## 2025-01-14 PROCEDURE — 3074F SYST BP LT 130 MM HG: CPT

## 2025-01-14 PROCEDURE — 3078F DIAST BP <80 MM HG: CPT

## 2025-01-14 PROCEDURE — 3008F BODY MASS INDEX DOCD: CPT

## 2025-01-14 PROCEDURE — 99214 OFFICE O/P EST MOD 30 MIN: CPT

## 2025-01-14 PROCEDURE — 1036F TOBACCO NON-USER: CPT

## 2025-01-14 RX ORDER — ERGOCALCIFEROL 1.25 MG/1
50000 CAPSULE ORAL
Qty: 4 CAPSULE | Refills: 1 | Status: SHIPPED | OUTPATIENT
Start: 2025-01-14 | End: 2025-03-15

## 2025-01-14 RX ORDER — ERGOCALCIFEROL 1.25 MG/1
50000 CAPSULE ORAL
Qty: 4 CAPSULE | Refills: 1 | Status: SHIPPED | OUTPATIENT
Start: 2025-01-19 | End: 2025-01-14

## 2025-01-14 ASSESSMENT — PATIENT HEALTH QUESTIONNAIRE - PHQ9
2. FEELING DOWN, DEPRESSED OR HOPELESS: NOT AT ALL
SUM OF ALL RESPONSES TO PHQ9 QUESTIONS 1 AND 2: 0
1. LITTLE INTEREST OR PLEASURE IN DOING THINGS: NOT AT ALL

## 2025-01-14 NOTE — PROGRESS NOTES
"Subjective   Patient ID: Adán Liu is a 54 y.o. male who presents for Follow-up (1 month follow up. Review labs).    Patient here today to follow up regarding blood work  Was seen by vascular medicine recently. Reviewed necessary medications and follow up in 1 year  Patient prior to most recent blood work, had been off meds for 1 month.   /Total cholesterol 272 without statin. Will recheck while patient is on medication.  The time of most recent blood draw had been in the 1 month absence.  Of time.             Review of Systems    Objective   /62 (BP Location: Right arm, Patient Position: Sitting)   Pulse 78   Temp 36.2 °C (97.2 °F)   Resp 16   Ht 1.651 m (5' 5\")   Wt 87.1 kg (192 lb)   SpO2 98%   BMI 31.95 kg/m²     Physical Exam  Constitutional:       General: He is not in acute distress.     Appearance: Normal appearance. He is not ill-appearing.   Eyes:      Conjunctiva/sclera: Conjunctivae normal.   Cardiovascular:      Rate and Rhythm: Normal rate and regular rhythm.      Heart sounds: No murmur heard.     No friction rub. No gallop.   Pulmonary:      Effort: Pulmonary effort is normal. No respiratory distress.      Breath sounds: Normal breath sounds. No wheezing, rhonchi or rales.   Abdominal:      General: Abdomen is flat. There is no distension.      Tenderness: There is no abdominal tenderness.      Comments: No audible bruit.   Neurological:      General: No focal deficit present.      Mental Status: He is alert and oriented to person, place, and time.   Psychiatric:         Mood and Affect: Mood normal.         Behavior: Behavior normal.         Assessment/Plan   Problem List Items Addressed This Visit    None  Visit Diagnoses         Codes    Vitamin D deficiency    -  Primary E55.9    Relevant Medications    ergocalciferol (Vitamin D2) 1.25 MG (18907 UT) capsule (Start on 1/19/2025)    Other Relevant Orders    Vitamin D 25-Hydroxy,Total (for eval of Vitamin D levels)    Mixed " hyperlipidemia     E78.2    Relevant Orders    Lipid panel        Discussed blood work with the patient. LDL significantly elevated along with known vascular pathology stressed the importance of adequate cholesterol modification.  Will repeat serum lipids while on current dose of Lipitor in approximately 2 months time.  Reviewed note from most recent vascular medicine visit.  Was further stressed importance of medication adherence.  Patient is mostly vegetarian, states he does not eat any meat products, does consume significant amounts of cheese, recommend that he further investigate diet to evaluate for areas of excess cholesterol consumption.  Recommend vitamin D supplementation, recheck vitamin D in 8 weeks.    Alvino Castillo, DO

## 2025-01-16 ENCOUNTER — APPOINTMENT (OUTPATIENT)
Dept: BEHAVIORAL HEALTH | Facility: CLINIC | Age: 55
End: 2025-01-16
Payer: COMMERCIAL

## 2025-01-16 ENCOUNTER — HOSPITAL ENCOUNTER (OUTPATIENT)
Dept: RADIOLOGY | Facility: CLINIC | Age: 55
Discharge: HOME | End: 2025-01-16
Payer: COMMERCIAL

## 2025-01-16 VITALS
SYSTOLIC BLOOD PRESSURE: 107 MMHG | TEMPERATURE: 97.2 F | BODY MASS INDEX: 31.82 KG/M2 | DIASTOLIC BLOOD PRESSURE: 68 MMHG | RESPIRATION RATE: 18 BRPM | WEIGHT: 191 LBS | HEART RATE: 82 BPM | HEIGHT: 65 IN

## 2025-01-16 DIAGNOSIS — N48.89 PENILE CYST: ICD-10-CM

## 2025-01-16 DIAGNOSIS — F98.8 ATTENTION DEFICIT DISORDER (ADD) WITHOUT HYPERACTIVITY: ICD-10-CM

## 2025-01-16 DIAGNOSIS — F31.9 BIPOLAR AND RELATED DISORDER (MULTI): ICD-10-CM

## 2025-01-16 PROCEDURE — 3078F DIAST BP <80 MM HG: CPT | Performed by: PSYCHIATRY & NEUROLOGY

## 2025-01-16 PROCEDURE — 3008F BODY MASS INDEX DOCD: CPT | Performed by: PSYCHIATRY & NEUROLOGY

## 2025-01-16 PROCEDURE — 99214 OFFICE O/P EST MOD 30 MIN: CPT | Performed by: PSYCHIATRY & NEUROLOGY

## 2025-01-16 PROCEDURE — 3074F SYST BP LT 130 MM HG: CPT | Performed by: PSYCHIATRY & NEUROLOGY

## 2025-01-16 PROCEDURE — 76870 US EXAM SCROTUM: CPT

## 2025-01-16 PROCEDURE — 76870 US EXAM SCROTUM: CPT | Performed by: STUDENT IN AN ORGANIZED HEALTH CARE EDUCATION/TRAINING PROGRAM

## 2025-01-16 RX ORDER — METHYLPHENIDATE HYDROCHLORIDE 20 MG/1
20 TABLET ORAL 2 TIMES DAILY
Qty: 60 TABLET | Refills: 0 | Status: SHIPPED | OUTPATIENT
Start: 2025-01-16 | End: 2025-02-15

## 2025-01-16 RX ORDER — ARIPIPRAZOLE 5 MG/1
5 TABLET ORAL DAILY
Qty: 90 TABLET | Refills: 1 | Status: SHIPPED | OUTPATIENT
Start: 2025-01-16 | End: 2025-07-15

## 2025-01-16 RX ORDER — DULOXETIN HYDROCHLORIDE 60 MG/1
60 CAPSULE, DELAYED RELEASE ORAL DAILY
Qty: 90 CAPSULE | Refills: 1 | Status: SHIPPED | OUTPATIENT
Start: 2025-01-16 | End: 2025-07-15

## 2025-01-16 ASSESSMENT — PAIN SCALES - GENERAL: PAINLEVEL_OUTOF10: 0-NO PAIN

## 2025-01-16 NOTE — PROGRESS NOTES
Subjective   Patient ID: Adán Liu is a 54 y.o. male who presents for No chief complaint on file.I am doing well.    HPI: The patient is doing well and continues to work.    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   Inpatient history: None   Suicide attempts/Self-Harm/Ideation History: None   Current providers: None   Past providers: Dr. Kaufman.   Past medication trials: Aripiprazole, duloxetine and methylphernidate.      Family History  Mother    · Family history of depression (V17.0) (Z81.8)   · No pertinent family history   · Family history of Other depression  Paternal Great Grandmother    · Family history of dementia (V17.2) (Z81.8)     Social History  Problems    · Born in [place]   · Completed college, associates degree   · Has a degree in [email or web address] technology.   · Completed elementary school   · Completed high school   · Employed   · Employed as a  currently.   · Has no children   · Monogamous relationship   · Never    · Never smoker     Allergies  Medication    · No Known Drug Allergies     Current Meds     Medication Name Instruction  ARIPiprazole 5 MG Oral Tablet (Abilify) TAKE ONE TABLET BY MOUTH EVERY DAY  DULoxetine HCl - 60 MG Oral Capsule Delayed Release Particles TAKE ONE CAPSULE BY MOUTH EVERY DAY  Methylphenidate HCl - 20 MG Oral Tablet Take 1 two times a day    Mental Status Exam     General: Appropriately groomed and dressed   Appearance: Appears stated age   Attitude: Calm,  cooperative   Behavior: Appropriate eye contact.   Motor Activity: No agitation or retardation. No EPS/TD. Normal gait and station.   Speech: Regular rate, rhythm, volume and tone, spontaneous, fluent.   Mood: Euthymic   Affect: Appropriate with full range   Thought Process: Organized, linear, goal directed. Associations are logical.   Thought Content: Does not endorse suicidal or homicidal ideation, no delusions elicited.   Thought Perception: Does not endorse auditory or visual hallucinations, does not appear to be responding to hallucinatory stimuli.   Cognition: Alert, oriented x3. No deficits noted. Adequate fund of knowledge. No deficit in recent and remote memory. No deficits in attention, concentration or language.    Insight: Good, as patient recognizes symptoms of illness and need for recommended treatments.   Judgment: Can make reasonable decisions about ordinary activities of daily living and necessary medical care recommendations.         Appearance: well-groomed.   Build: average.   Demeanor: average.   Eye Contact: average.   Motor Activity: average.   Speech: clear.   Language: Neurologic language is intact.   Fund of Knowledge: fair fund of knowledge.   Delusions: None Reported.   Self Harm: None Reported.   Aggressive: None Reported.   Mood: euthymic.   Affect: full.   Orientation: alert, oriented x3.   Manner: cooperative.   Thought process: goal-directed.   Thought association: displays rational thought process.   Content of thought: Mr. JAEL BRENNER denies any suicidal or homicidal ideation or plans.   Abstract/ Rational Thought: intact   Memory: grossly intact.   Behavior: calm.   Attention/Concentration: normal.   Cognition: intact.   Intelligence Estimate: average.   Executive Function: intact.   Insight: intact.   Judgement: intact.         Review of Systems   Neurological:         Mental Status Exam     General: Appropriately groomed and dressed   Appearance: Appears stated age   Attitude:  Calm, cooperative   Behavior: Appropriate eye contact.   Motor Activity: No agitation or retardation. No EPS/TD. Normal gait and station.   Speech: Regular rate, rhythm, volume and tone, spontaneous, fluent.   Mood: Euthymic   Affect: Appropriate with full range   Thought Process: Organized, linear, goal directed. Associations are logical.   Thought Content: Does not endorse suicidal or homicidal ideation, no delusions elicited.   Thought Perception: Does not endorse auditory or visual hallucinations, does not appear to be responding to hallucinatory stimuli.   Cognition: Alert, oriented x3. No deficits noted. Adequate fund of knowledge. No deficit in recent and remote memory. No deficits in attention, concentration or language.    Insight: Good, as patient recognizes symptoms of illness and need for recommended treatments.   Judgment: Can make reasonable decisions about ordinary activities of daily living and necessary medical care recommendations.         Appearance: well-groomed.   Build: average.   Demeanor: average.   Eye Contact: average.   Motor Activity: average.   Speech: clear.   Language: Neurologic language is intact.   Fund of Knowledge: fair fund of knowledge.   Delusions: None Reported.   Self Harm: None Reported.   Aggressive: None Reported.   Mood: euthymic.   Affect: full.   Orientation: alert, oriented x3.   Manner: cooperative.   Thought process: goal-directed.   Thought association: displays rational thought process.   Content of thought: Mr. JAEL BRENNER denies any suicidal or homicidal ideation or plans.   Abstract/ Rational Thought: intact   Memory: grossly intact.   Behavior: calm.   Attention/Concentration: normal.   Cognition: intact.   Intelligence Estimate: average.   Executive Function: intact.   Insight: intact.   Judgement: intact.      Psychiatric/Behavioral:          Mental Status Exam     General: Appropriately groomed and dressed   Appearance: Appears stated age   Attitude: Calm,  cooperative   Behavior: Appropriate eye contact.   Motor Activity: No agitation or retardation. No EPS/TD. Normal gait and station.   Speech: Regular rate, rhythm, volume and tone, spontaneous, fluent.   Mood: Euthymic   Affect: Appropriate with full range   Thought Process: Organized, linear, goal directed. Associations are logical.   Thought Content: Does not endorse suicidal or homicidal ideation, no delusions elicited.   Thought Perception: Does not endorse auditory or visual hallucinations, does not appear to be responding to hallucinatory stimuli.   Cognition: Alert, oriented x3. No deficits noted. Adequate fund of knowledge. No deficit in recent and remote memory. No deficits in attention, concentration or language.    Insight: Good, as patient recognizes symptoms of illness and need for recommended treatments.   Judgment: Can make reasonable decisions about ordinary activities of daily living and necessary medical care recommendations.         Appearance: well-groomed.   Build: average.   Demeanor: average.   Eye Contact: average.   Motor Activity: average.   Speech: clear.   Language: Neurologic language is intact.   Fund of Knowledge: fair fund of knowledge.   Delusions: None Reported.   Self Harm: None Reported.   Aggressive: None Reported.   Mood: euthymic.   Affect: full.   Orientation: alert, oriented x3.   Manner: cooperative.   Thought process: goal-directed.   Thought association: displays rational thought process.   Content of thought: Mr. JAEL BRENNER denies any suicidal or homicidal ideation or plans.   Abstract/ Rational Thought: intact   Memory: grossly intact.   Behavior: calm.   Attention/Concentration: normal.   Cognition: intact.   Intelligence Estimate: average.   Executive Function: intact.   Insight: intact.   Judgement: intact.        Psych Review of Symptoms:    ADHD: Patient denied any symptoms.         Anxiety: Patient denied any symptoms.         Developmental and Sensory Concerns:  Patient denied any symptoms.         Depressive Symptoms: Patient denied any symptoms.         Disruptive and Conduct Symptoms: Patient denied any symptoms.         Eating / Feeding Concerns: Patient denied any symptoms.         Elimination Symptoms: Patient denied any symptoms.         Manic Symptoms: Patient denied any symptoms.         Obsessive-Compulsive Symptoms: Patient denied any symptoms.         Psychotic Symptoms: Patient denied any symptoms.           Trauma Related Symptoms: Patient denied any symptoms.           Sleep Concerns: Patient denied any symptoms.             Objective   Physical Exam  Neurological:      General: No focal deficit present.      Mental Status: He is oriented to person, place, and time.      Comments: Mental Status Exam     General: Appropriately groomed and dressed   Appearance: Appears stated age   Attitude: Calm, cooperative   Behavior: Appropriate eye contact.   Motor Activity: No agitation or retardation. No EPS/TD. Normal gait and station.   Speech: Regular rate, rhythm, volume and tone, spontaneous, fluent.   Mood: Euthymic   Affect: Appropriate with full range   Thought Process: Organized, linear, goal directed. Associations are logical.   Thought Content: Does not endorse suicidal or homicidal ideation, no delusions elicited.   Thought Perception: Does not endorse auditory or visual hallucinations, does not appear to be responding to hallucinatory stimuli.   Cognition: Alert, oriented x3. No deficits noted. Adequate fund of knowledge. No deficit in recent and remote memory. No deficits in attention, concentration or language.    Insight: Good, as patient recognizes symptoms of illness and need for recommended treatments.   Judgment: Can make reasonable decisions about ordinary activities of daily living and necessary medical care recommendations.         Appearance: well-groomed.   Build: average.   Demeanor: average.   Eye Contact: average.   Motor Activity: average.    Speech: clear.   Language: Neurologic language is intact.   Fund of Knowledge: fair fund of knowledge.   Delusions: None Reported.   Self Harm: None Reported.   Aggressive: None Reported.   Mood: euthymic.   Affect: full.   Orientation: alert, oriented x3.   Manner: cooperative.   Thought process: goal-directed.   Thought association: displays rational thought process.   Content of thought: Mr. JAEL BRENNER denies any suicidal or homicidal ideation or plans.   Abstract/ Rational Thought: intact   Memory: grossly intact.   Behavior: calm.   Attention/Concentration: normal.   Cognition: intact.   Intelligence Estimate: average.   Executive Function: intact.   Insight: intact.   Judgement: intact.      Psychiatric:         Mood and Affect: Mood normal.         Behavior: Behavior normal.         Thought Content: Thought content normal.         Judgment: Judgment normal.      Comments: Mental Status Exam     General: Appropriately groomed and dressed   Appearance: Appears stated age   Attitude: Calm, cooperative   Behavior: Appropriate eye contact.   Motor Activity: No agitation or retardation. No EPS/TD. Normal gait and station.   Speech: Regular rate, rhythm, volume and tone, spontaneous, fluent.   Mood: Euthymic   Affect: Appropriate with full range   Thought Process: Organized, linear, goal directed. Associations are logical.   Thought Content: Does not endorse suicidal or homicidal ideation, no delusions elicited.   Thought Perception: Does not endorse auditory or visual hallucinations, does not appear to be responding to hallucinatory stimuli.   Cognition: Alert, oriented x3. No deficits noted. Adequate fund of knowledge. No deficit in recent and remote memory. No deficits in attention, concentration or language.    Insight: Good, as patient recognizes symptoms of illness and need for recommended treatments.   Judgment: Can make reasonable decisions about ordinary activities of daily living and necessary medical  care recommendations.         Appearance: well-groomed.   Build: average.   Demeanor: average.   Eye Contact: average.   Motor Activity: average.   Speech: clear.   Language: Neurologic language is intact.   Fund of Knowledge: fair fund of knowledge.   Delusions: None Reported.   Self Harm: None Reported.   Aggressive: None Reported.   Mood: euthymic.   Affect: full.   Orientation: alert, oriented x3.   Manner: cooperative.   Thought process: goal-directed.   Thought association: displays rational thought process.   Content of thought: Mr. JAEL BRENNER denies any suicidal or homicidal ideation or plans.   Abstract/ Rational Thought: intact   Memory: grossly intact.   Behavior: calm.   Attention/Concentration: normal.   Cognition: intact.   Intelligence Estimate: average.   Executive Function: intact.   Insight: intact.   Judgement: intact.            Lab Review:   Lab on 01/08/2025   Component Date Value    Vitamin D, 25-Hydroxy, T* 01/08/2025 16 (L)     Glucose 01/08/2025 99     Sodium 01/08/2025 139     Potassium 01/08/2025 4.2     Chloride 01/08/2025 102     Bicarbonate 01/08/2025 27     Anion Gap 01/08/2025 14     Urea Nitrogen 01/08/2025 17     Creatinine 01/08/2025 0.97     eGFR 01/08/2025 >90     Calcium 01/08/2025 9.7     Albumin 01/08/2025 4.2     Alkaline Phosphatase 01/08/2025 115     Total Protein 01/08/2025 6.4     AST 01/08/2025 13     Bilirubin, Total 01/08/2025 0.5     ALT 01/08/2025 25     Thyroid Stimulating Horm* 01/08/2025 1.87     Cholesterol 01/08/2025 272 (H)     HDL-Cholesterol 01/08/2025 39.1     Cholesterol/HDL Ratio 01/08/2025 7.0     LDL Calculated 01/08/2025 200 (H)     VLDL 01/08/2025 33     Triglycerides 01/08/2025 164 (H)     Non HDL Cholesterol 01/08/2025 233 (H)     WBC 01/08/2025 7.8     nRBC 01/08/2025 0.0     RBC 01/08/2025 4.79     Hemoglobin 01/08/2025 14.0     Hematocrit 01/08/2025 42.0     MCV 01/08/2025 88     MCH 01/08/2025 29.2     MCHC 01/08/2025 33.3     RDW 01/08/2025  13.1     Platelets 01/08/2025 237     Neutrophils % 01/08/2025 61.4     Immature Granulocytes %,* 01/08/2025 0.5     Lymphocytes % 01/08/2025 27.6     Monocytes % 01/08/2025 7.0     Eosinophils % 01/08/2025 2.2     Basophils % 01/08/2025 1.3     Neutrophils Absolute 01/08/2025 4.80     Immature Granulocytes Ab* 01/08/2025 0.04     Lymphocytes Absolute 01/08/2025 2.16     Monocytes Absolute 01/08/2025 0.55     Eosinophils Absolute 01/08/2025 0.17     Basophils Absolute 01/08/2025 0.10    Office Visit on 01/07/2025   Component Date Value    POC Color, Urine 01/07/2025 Yellow     POC Appearance, Urine 01/07/2025 Clear     POC Glucose, Urine 01/07/2025 NEGATIVE     POC Bilirubin, Urine 01/07/2025 NEGATIVE     POC Ketones, Urine 01/07/2025 NEGATIVE     POC Specific Gravity, Ur* 01/07/2025 >=1.030     POC Blood, Urine 01/07/2025 NEGATIVE     POC PH, Urine 01/07/2025 6.0     POC Protein, Urine 01/07/2025 NEGATIVE     POC Urobilinogen, Urine 01/07/2025 0.2     Poc Nitrite, Urine 01/07/2025 NEGATIVE     POC Leukocytes, Urine 01/07/2025 NEGATIVE    Lab on 10/03/2024   Component Date Value    Amphetamine Screen, Urine 10/03/2024 Presumptive Negative    Lab on 07/23/2024   Component Date Value    Glucose 07/23/2024 105 (H)     Sodium 07/23/2024 140     Potassium 07/23/2024 4.5     Chloride 07/23/2024 101     Bicarbonate 07/23/2024 31     Anion Gap 07/23/2024 13     Urea Nitrogen 07/23/2024 14     Creatinine 07/23/2024 0.98     eGFR 07/23/2024 >90     Calcium 07/23/2024 9.2     Phosphorus 07/23/2024 3.4     Albumin 07/23/2024 4.2        Assessment/Plan   Psychiatric Risk Assessment  Violence Risk Assessment: none  Acute Risk of Harm to Others is Considered: low   Suicide Risk Assessment: age > 50 yrs old and   Protective Factors against Suicide: adherence to  treatment, fear of suicide, moral objections to suicide, positive family relationships, and sense of responsibility toward family  Acute Risk of Harm to Self is  Considered: low    Imminent Risk of Suicide or Serious Self-Injury: Low   Chronic Risk of Suicide of Serious Self-Injury: Low  Risk factors: Age, depression history and   Protective factors: Denies current suicidal ideation, denies history of suicide attempts , willingness to seek help and support , gender, access to a variety of clinical interventions , and receiving and engaged in care for mental, physical, and substance use disorders      Imminent Risk of Violence or Homicide: Low   Risk Factors: No significant risk factors identified on screening  Protective Factors: Lack of known history of harm to others , Lack of known history of violent ideation , and lack of known access to firearms.     Assessment & Plan  Attention deficit disorder (ADD) without hyperactivity  Your diagnosis is depression and anxiety complicated by a history of ADD in substantial remission.     Please follow up in late April of 2025 and sooner virtually if needed as discussed today. For all urgent or emergency matters please call 911, go to urgent care or the emergency department of the nearest hospital.     Urine toxicology screens and controlled substances agreements were completed at this appointment.     [place] was reviewed today with no concerning findings evidenced.     You can continue aripiprazole 5 mg daily, duloxetine 60 mg daily and methylphenidate 20 mg once daily. You a clearly are able to recite back the risks, benefits, potential interactions and alternatives of these medications as discussed with you today.     The FDA risks of antidepressants including increased risk of suicide, cardiotoxicity, weight gain, lowered seizure threshold, the serotonin syndrome with serotonin agents, individually or with multiple serotonin agent interaction and anticholinergic effects have been discussed. We also discussed that at least in the case of SSRIs there is interference with warfarin and nonsteroidal inflammatories and can cause  increased bleeding and hemorrhage. We reviewed the risks of agranulocytosis and neutropenia with sertraline and mirtazapine and that risk according to [employer] data is about 0.1% for both mirtazapine and sertraline. In the case of duloxetine we reviewed precautions, morbidity and mortality, regarding its application in hepatic or renal disease secondary to its metabolism by those organs. You clearly were able to recite back the risks, benefits and alternatives to the antidepressants discussed with you today.     The FDA risks of antipsychotics including increased risk of sudden death, heart attack, brain stroke, irreversible neurological movement disorders, parkinsonism, cardiac toxicity, weight gain and diabetes of this medication were discussed. You clearly were able to recite the risks, benefits and alternatives of the antipsychotics discussed with you today.     The FDA risks of amphetamines including heart attack, abuse potential and drug interactions were discussed. You clearly were able to recite back to me the risks, benefits and alternatives to the amphetamines as discussed with you today.      Orders:    methylphenidate (Ritalin) 20 mg tablet; Take 1 tablet (20 mg) by mouth 2 times a day.    Bipolar and related disorder (Multi)  Your diagnosis is depression and anxiety complicated by a history of ADD in substantial remission.     Please follow up in late April of 2025 and sooner virtually if needed as discussed today. For all urgent or emergency matters please call 911, go to urgent care or the emergency department of the nearest hospital.     Urine toxicology screens and controlled substances agreements were completed at this appointment.     OARRS was reviewed today with no concerning findings evidenced.     You can continue aripiprazole 5 mg daily, duloxetine 60 mg daily and methylphenidate 20 mg once daily. You a clearly are able to recite back the risks, benefits, potential interactions and  alternatives of these medications as discussed with you today.     The FDA risks of antidepressants including increased risk of suicide, cardiotoxicity, weight gain, lowered seizure threshold, the serotonin syndrome with serotonin agents, individually or with multiple serotonin agent interaction and anticholinergic effects have been discussed. We also discussed that at least in the case of SSRIs there is interference with warfarin and nonsteroidal inflammatories and can cause increased bleeding and hemorrhage. We reviewed the risks of agranulocytosis and neutropenia with sertraline and mirtazapine and that risk according to [employer] data is about 0.1% for both mirtazapine and sertraline. In the case of duloxetine we reviewed precautions, morbidity and mortality, regarding its application in hepatic or renal disease secondary to its metabolism by those organs. You clearly were able to recite back the risks, benefits and alternatives to the antidepressants discussed with you today.     The FDA risks of antipsychotics including increased risk of sudden death, heart attack, brain stroke, irreversible neurological movement disorders, parkinsonism, cardiac toxicity, weight gain and diabetes of this medication were discussed. You clearly were able to recite the risks, benefits and alternatives of the antipsychotics discussed with you today.     The FDA risks of amphetamines including heart attack, abuse potential and drug interactions were discussed. You clearly were able to recite back to me the risks, benefits and alternatives to the amphetamines as discussed with you today.      Orders:    ARIPiprazole (Abilify) 5 mg tablet; Take 1 tablet (5 mg) by mouth once daily.    DULoxetine (Cymbalta) 60 mg DR capsule; Take 1 capsule (60 mg) by mouth once daily. Do not crush or chew.    Time:   Prep time on date of the patient encounter: 5 minutes.   Time spent directly with patient/family/caregiver: 20 minutes.   Additional  time spent on patient care activities:  minutes.   Documentation time: 5 minutes.   Total time on date of patient encounter: 30 minutes.

## 2025-02-14 DIAGNOSIS — F98.8 ATTENTION DEFICIT DISORDER (ADD) WITHOUT HYPERACTIVITY: ICD-10-CM

## 2025-02-14 DIAGNOSIS — F90.0 ATTENTION DEFICIT HYPERACTIVITY DISORDER (ADHD), PREDOMINANTLY INATTENTIVE TYPE: ICD-10-CM

## 2025-02-14 RX ORDER — METHYLPHENIDATE HYDROCHLORIDE 20 MG/1
20 TABLET ORAL 2 TIMES DAILY
Qty: 60 TABLET | Refills: 0 | Status: SHIPPED | OUTPATIENT
Start: 2025-02-14 | End: 2025-03-16

## 2025-03-14 DIAGNOSIS — F90.0 ATTENTION DEFICIT HYPERACTIVITY DISORDER (ADHD), PREDOMINANTLY INATTENTIVE TYPE: ICD-10-CM

## 2025-03-14 DIAGNOSIS — F98.8 ATTENTION DEFICIT DISORDER (ADD) WITHOUT HYPERACTIVITY: ICD-10-CM

## 2025-03-14 RX ORDER — METHYLPHENIDATE HYDROCHLORIDE 20 MG/1
20 TABLET ORAL 2 TIMES DAILY
Qty: 60 TABLET | Refills: 0 | Status: SHIPPED | OUTPATIENT
Start: 2025-03-14 | End: 2025-04-13

## 2025-04-14 DIAGNOSIS — F98.8 ATTENTION DEFICIT DISORDER (ADD) WITHOUT HYPERACTIVITY: ICD-10-CM

## 2025-04-14 RX ORDER — METHYLPHENIDATE HYDROCHLORIDE 20 MG/1
20 TABLET ORAL 2 TIMES DAILY
Qty: 60 TABLET | Refills: 0 | Status: SHIPPED | OUTPATIENT
Start: 2025-04-14 | End: 2025-05-14

## 2025-04-24 ENCOUNTER — APPOINTMENT (OUTPATIENT)
Dept: BEHAVIORAL HEALTH | Facility: CLINIC | Age: 55
End: 2025-04-24
Payer: COMMERCIAL

## 2025-04-24 VITALS
HEIGHT: 65 IN | RESPIRATION RATE: 18 BRPM | DIASTOLIC BLOOD PRESSURE: 70 MMHG | BODY MASS INDEX: 31.87 KG/M2 | HEART RATE: 80 BPM | WEIGHT: 191.3 LBS | SYSTOLIC BLOOD PRESSURE: 106 MMHG | TEMPERATURE: 97.9 F

## 2025-04-24 DIAGNOSIS — F41.8 MIXED ANXIETY DEPRESSIVE DISORDER: ICD-10-CM

## 2025-04-24 DIAGNOSIS — F90.0 ATTENTION DEFICIT HYPERACTIVITY DISORDER (ADHD), PREDOMINANTLY INATTENTIVE TYPE: ICD-10-CM

## 2025-04-24 PROCEDURE — 3074F SYST BP LT 130 MM HG: CPT | Performed by: PSYCHIATRY & NEUROLOGY

## 2025-04-24 PROCEDURE — 3078F DIAST BP <80 MM HG: CPT | Performed by: PSYCHIATRY & NEUROLOGY

## 2025-04-24 PROCEDURE — 3008F BODY MASS INDEX DOCD: CPT | Performed by: PSYCHIATRY & NEUROLOGY

## 2025-04-24 PROCEDURE — 99214 OFFICE O/P EST MOD 30 MIN: CPT | Performed by: PSYCHIATRY & NEUROLOGY

## 2025-04-24 ASSESSMENT — PAIN SCALES - GENERAL: PAINLEVEL_OUTOF10: 0-NO PAIN

## 2025-04-24 NOTE — ASSESSMENT & PLAN NOTE
Your diagnosis is depression and anxiety complicated by a history of ADD in substantial remission.     Please follow up in July of 2025 and sooner virtually if needed as discussed today. For all urgent or emergency matters please call 911, go to urgent care or the emergency department of the nearest hospital.     Urine toxicology screens and controlled substances agreements were completed at this appointment.     OARRS was reviewed today with no concerning findings evidenced.     You can continue aripiprazole 5 mg daily, duloxetine 60 mg daily and methylphenidate 20 mg once daily. You a clearly are able to recite back the risks, benefits, potential interactions and alternatives of these medications as discussed with you today.     The FDA risks of antidepressants including increased risk of suicide, cardiotoxicity, weight gain, lowered seizure threshold, the serotonin syndrome with serotonin agents, individually or with multiple serotonin agent interaction and anticholinergic effects have been discussed. We also discussed that at least in the case of SSRIs there is interference with warfarin and nonsteroidal inflammatories and can cause increased bleeding and hemorrhage. We reviewed the risks of agranulocytosis and neutropenia with sertraline and mirtazapine and that risk according to [employer] data is about 0.1% for both mirtazapine and sertraline. In the case of duloxetine we reviewed precautions, morbidity and mortality, regarding its application in hepatic or renal disease secondary to its metabolism by those organs. You clearly were able to recite back the risks, benefits and alternatives to the antidepressants discussed with you today.     The FDA risks of antipsychotics including increased risk of sudden death, heart attack, brain stroke, irreversible neurological movement disorders, parkinsonism, cardiac toxicity, weight gain and diabetes of this medication were discussed. You clearly were able to recite  the risks, benefits and alternatives of the antipsychotics discussed with you today.     The FDA risks of amphetamines including heart attack, abuse potential and drug interactions were discussed. You clearly were able to recite back to me the risks, benefits and alternatives to the amphetamines as discussed with you today.     Follow up in July of 2025.

## 2025-04-24 NOTE — PROGRESS NOTES
[facility] Nurse Note    Name: Adán Liu  MRN: [id number]  YOB: 1970    Date: 04/24/25    Reason for Visit:  No chief complaint on file.    Vitals:       Problem List[1]    Allergies[2]    Encounter Medications[3]    Progress:             [1]   Patient Active Problem List  Diagnosis    ADHD (attention deficit hyperactivity disorder)    Dissection of mesenteric artery (Multi)    Primary hypertension    Mixed anxiety depressive disorder    Gastroesophageal reflux disease   [2]   Allergies  Allergen Reactions    Latex Hives   [3]   Outpatient Encounter Medications as of 4/24/2025   Medication Sig Dispense Refill    ARIPiprazole (Abilify) 5 mg tablet Take 1 tablet (5 mg) by mouth once daily. 90 tablet 1    aspirin 81 mg EC tablet TAKE 1 TABLET BY MOUTH ONCE DAILY. 90 tablet 3    atorvastatin (Lipitor) 40 mg tablet Take 1 tablet (40 mg) by mouth once daily. 90 tablet 3    DULoxetine (Cymbalta) 60 mg DR capsule Take 1 capsule (60 mg) by mouth once daily. Do not crush or chew. 90 capsule 1    lisinopril 10 mg tablet TAKE 1 TABLET BY MOUTH ONCE DAILY. 90 tablet 3    methylphenidate (Ritalin) 20 mg tablet Take 1 tablet (20 mg) by mouth 2 times a day. 60 tablet 0    omeprazole OTC (PriLOSEC OTC) 20 mg EC tablet Take 1 tablet (20 mg) by mouth once daily in the morning. Take before meals. Do not crush, chew, or split.       No facility-administered encounter medications on file as of 4/24/2025.

## 2025-04-24 NOTE — PROGRESS NOTES
Subjective   Patient ID: Adán Liu is a 54 y.o. male who presents for No chief complaint on file. I am looking for a new job.     HPI: The patient is doing well and continues to work.     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   Inpatient history: None   Suicide attempts/Self-Harm/Ideation History: None   Current providers: None   Past providers: Dr. Kaufman.   Past medication trials: Aripiprazole, duloxetine and methylphernidate.      Family History  Mother    · Family history of depression (V17.0) (Z81.8)   · No pertinent family history   · Family history of Other depression  Paternal Great Grandmother    · Family history of dementia (V17.2) (Z81.8)     Social History  Problems    · Born in [place]   · Completed college, associates degree   · Has a degree in [employer] technology.   · Completed elementary school   · Completed high school   · Employed   · Employed as a  currently.   · Has no children   · Monogamous relationship   · Never    · Never smoker     Allergies  Medication    · No Known Drug Allergies     Current Meds     Medication NameInstruction  ARIPiprazole 5 MG Oral Tablet (Abilify)TAKE ONE TABLET BY MOUTH EVERY DAY  DULoxetine HCl - 60 MG Oral Capsule Delayed Release ParticlesTAKE ONE CAPSULE BY MOUTH EVERY DAY  Methylphenidate HCl - 20 MG Oral TabletTake 1 two times a day     Mental Status Exam     General: Appropriately groomed and dressed   Appearance: Appears stated age   Attitude:  Calm, cooperative   Behavior: Appropriate eye contact.   Motor Activity: No agitation or retardation. No EPS/TD. Normal gait and station.   Speech: Regular rate, rhythm, volume and tone, spontaneous, fluent.   Mood: Euthymic   Affect: Appropriate with full range   Thought Process: Organized, linear, goal directed. Associations are logical.   Thought Content: Does not endorse suicidal or homicidal ideation, no delusions elicited.   Thought Perception: Does not endorse auditory or visual hallucinations, does not appear to be responding to hallucinatory stimuli.   Cognition: Alert, oriented x3. No deficits noted. Adequate fund of knowledge. No deficit in recent and remote memory. No deficits in attention, concentration or language.    Insight: Good, as patient recognizes symptoms of illness and need for recommended treatments.   Judgment: Can make reasonable decisions about ordinary activities of daily living and necessary medical care recommendations.         Appearance: well-groomed.   Build: average.   Demeanor: average.   Eye Contact: average.   Motor Activity: average.   Speech: clear.   Language: Neurologic language is intact.   Fund of Knowledge: fair fund of knowledge.   Delusions: None Reported.   Self Harm: None Reported.   Aggressive: None Reported.   Mood: euthymic.   Affect: full.   Orientation: alert, oriented x3.   Manner: cooperative.   Thought process: goal-directed.   Thought association: displays rational thought process.   Content of thought: Mr. JAEL BRENNER denies any suicidal or homicidal ideation or plans.   Abstract/ Rational Thought: Intact   Memory: Grossly intact.   Behavior: Calm.   Attention/Concentration: Normal.   Cognition: intact.   Intelligence Estimate: Average.   Executive Function: Intact.   Insight: Intact.   Judgement: Intact.         Review of Systems   Neurological:         Mental Status Exam     General: Appropriately groomed and dressed   Appearance: Appears stated age    Attitude: Calm, cooperative   Behavior: Appropriate eye contact.   Motor Activity: No agitation or retardation. No EPS/TD. Normal gait and station.   Speech: Regular rate, rhythm, volume and tone, spontaneous, fluent.   Mood: Euthymic   Affect: Appropriate with full range   Thought Process: Organized, linear, goal directed. Associations are logical.   Thought Content: Does not endorse suicidal or homicidal ideation, no delusions elicited.   Thought Perception: Does not endorse auditory or visual hallucinations, does not appear to be responding to hallucinatory stimuli.   Cognition: Alert, oriented x3. No deficits noted. Adequate fund of knowledge. No deficit in recent and remote memory. No deficits in attention, concentration or language.    Insight: Good, as patient recognizes symptoms of illness and need for recommended treatments.   Judgment: Can make reasonable decisions about ordinary activities of daily living and necessary medical care recommendations.         Appearance: well-groomed.   Build: average.   Demeanor: average.   Eye Contact: average.   Motor Activity: average.   Speech: clear.   Language: Neurologic language is intact.   Fund of Knowledge: fair fund of knowledge.   Delusions: None Reported.   Self Harm: None Reported.   Aggressive: None Reported.   Mood: euthymic.   Affect: full.   Orientation: alert, oriented x3.   Manner: cooperative.   Thought process: goal-directed.   Thought association: displays rational thought process.   Content of thought: Mr. JAEL BRENNER denies any suicidal or homicidal ideation or plans.   Abstract/ Rational Thought: intact   Memory: grossly intact.   Behavior: calm.   Attention/Concentration: normal.   Cognition: intact.   Intelligence Estimate: average.   Executive Function: intact.   Insight: intact.   Judgement: intact.      Psychiatric/Behavioral:          Mental Status Exam     General: Appropriately groomed and dressed   Appearance: Appears stated age    Attitude: Calm, cooperative   Behavior: Appropriate eye contact.   Motor Activity: No agitation or retardation. No EPS/TD. Normal gait and station.   Speech: Regular rate, rhythm, volume and tone, spontaneous, fluent.   Mood: Euthymic   Affect: Appropriate with full range   Thought Process: Organized, linear, goal directed. Associations are logical.   Thought Content: Does not endorse suicidal or homicidal ideation, no delusions elicited.   Thought Perception: Does not endorse auditory or visual hallucinations, does not appear to be responding to hallucinatory stimuli.   Cognition: Alert, oriented x3. No deficits noted. Adequate fund of knowledge. No deficit in recent and remote memory. No deficits in attention, concentration or language.    Insight: Good, as patient recognizes symptoms of illness and need for recommended treatments.   Judgment: Can make reasonable decisions about ordinary activities of daily living and necessary medical care recommendations.         Appearance: well-groomed.   Build: average.   Demeanor: average.   Eye Contact: average.   Motor Activity: average.   Speech: clear.   Language: Neurologic language is intact.   Fund of Knowledge: fair fund of knowledge.   Delusions: None Reported.   Self Harm: None Reported.   Aggressive: None Reported.   Mood: euthymic.   Affect: full.   Orientation: alert, oriented x3.   Manner: cooperative.   Thought process: goal-directed.   Thought association: displays rational thought process.   Content of thought: Mr. JAEL BRENNER denies any suicidal or homicidal ideation or plans.   Abstract/ Rational Thought: intact   Memory: grossly intact.   Behavior: calm.   Attention/Concentration: normal.   Cognition: intact.   Intelligence Estimate: average.   Executive Function: intact.   Insight: intact.   Judgement: intact.      All other systems reviewed and are negative.    Psych Review of Symptoms:    ADHD: Patient denied any symptoms.         Anxiety: Patient  denied any symptoms.         Developmental and Sensory Concerns: Patient denied any symptoms.         Depressive Symptoms: Patient denied any symptoms.         Disruptive and Conduct Symptoms: Patient denied any symptoms.         Eating / Feeding Concerns: Patient denied any symptoms.         Elimination Symptoms: Patient denied any symptoms.         Manic Symptoms: Patient denied any symptoms.         Obsessive-Compulsive Symptoms: Patient denied any symptoms.         Psychotic Symptoms: Patient denied any symptoms.           Trauma Related Symptoms: Patient denied any symptoms.           Sleep Concerns: Patient denied any symptoms.             Objective   Physical Exam  Neurological:      Mental Status: He is alert and oriented to person, place, and time.      Comments: Mental Status Exam     General: Appropriately groomed and dressed   Appearance: Appears stated age   Attitude: Calm, cooperative   Behavior: Appropriate eye contact.   Motor Activity: No agitation or retardation. No EPS/TD. Normal gait and station.   Speech: Regular rate, rhythm, volume and tone, spontaneous, fluent.   Mood: Euthymic   Affect: Appropriate with full range   Thought Process: Organized, linear, goal directed. Associations are logical.   Thought Content: Does not endorse suicidal or homicidal ideation, no delusions elicited.   Thought Perception: Does not endorse auditory or visual hallucinations, does not appear to be responding to hallucinatory stimuli.   Cognition: Alert, oriented x3. No deficits noted. Adequate fund of knowledge. No deficit in recent and remote memory. No deficits in attention, concentration or language.    Insight: Good, as patient recognizes symptoms of illness and need for recommended treatments.   Judgment: Can make reasonable decisions about ordinary activities of daily living and necessary medical care recommendations.         Appearance: well-groomed.   Build: average.   Demeanor: average.   Eye Contact:  average.   Motor Activity: average.   Speech: clear.   Language: Neurologic language is intact.   Fund of Knowledge: fair fund of knowledge.   Delusions: None Reported.   Self Harm: None Reported.   Aggressive: None Reported.   Mood: euthymic.   Affect: full.   Orientation: alert, oriented x3.   Manner: cooperative.   Thought process: goal-directed.   Thought association: displays rational thought process.   Content of thought: Mr. JAEL BRENNER denies any suicidal or homicidal ideation or plans.   Abstract/ Rational Thought: intact   Memory: grossly intact.   Behavior: calm.   Attention/Concentration: normal.   Cognition: intact.   Intelligence Estimate: average.   Executive Function: intact.   Insight: intact.   Judgement: intact.      Psychiatric:      Comments: Mental Status Exam     General: Appropriately groomed and dressed   Appearance: Appears stated age   Attitude: Calm, cooperative   Behavior: Appropriate eye contact.   Motor Activity: No agitation or retardation. No EPS/TD. Normal gait and station.   Speech: Regular rate, rhythm, volume and tone, spontaneous, fluent.   Mood: Euthymic   Affect: Appropriate with full range   Thought Process: Organized, linear, goal directed. Associations are logical.   Thought Content: Does not endorse suicidal or homicidal ideation, no delusions elicited.   Thought Perception: Does not endorse auditory or visual hallucinations, does not appear to be responding to hallucinatory stimuli.   Cognition: Alert, oriented x3. No deficits noted. Adequate fund of knowledge. No deficit in recent and remote memory. No deficits in attention, concentration or language.    Insight: Good, as patient recognizes symptoms of illness and need for recommended treatments.   Judgment: Can make reasonable decisions about ordinary activities of daily living and necessary medical care recommendations.         Appearance: well-groomed.   Build: average.   Demeanor: average.   Eye Contact: average.    Motor Activity: average.   Speech: clear.   Language: Neurologic language is intact.   Fund of Knowledge: fair fund of knowledge.   Delusions: None Reported.   Self Harm: None Reported.   Aggressive: None Reported.   Mood: euthymic.   Affect: full.   Orientation: alert, oriented x3.   Manner: cooperative.   Thought process: goal-directed.   Thought association: displays rational thought process.   Content of thought: Mr. JAEL BRENNER denies any suicidal or homicidal ideation or plans.   Abstract/ Rational Thought: intact   Memory: grossly intact.   Behavior: calm.   Attention/Concentration: normal.   Cognition: intact.   Intelligence Estimate: average.   Executive Function: intact.   Insight: intact.   Judgement: intact.            Lab Review:   Lab on 01/08/2025   Component Date Value    Vitamin D, 25-Hydroxy, T* 01/08/2025 16 (L)     Glucose 01/08/2025 99     Sodium 01/08/2025 139     Potassium 01/08/2025 4.2     Chloride 01/08/2025 102     Bicarbonate 01/08/2025 27     Anion Gap 01/08/2025 14     Urea Nitrogen 01/08/2025 17     Creatinine 01/08/2025 0.97     eGFR 01/08/2025 >90     Calcium 01/08/2025 9.7     Albumin 01/08/2025 4.2     Alkaline Phosphatase 01/08/2025 115     Total Protein 01/08/2025 6.4     AST 01/08/2025 13     Bilirubin, Total 01/08/2025 0.5     ALT 01/08/2025 25     Thyroid Stimulating Horm* 01/08/2025 1.87     Cholesterol 01/08/2025 272 (H)     HDL-Cholesterol 01/08/2025 39.1     Cholesterol/HDL Ratio 01/08/2025 7.0     LDL Calculated 01/08/2025 200 (H)     VLDL 01/08/2025 33     Triglycerides 01/08/2025 164 (H)     Non HDL Cholesterol 01/08/2025 233 (H)     WBC 01/08/2025 7.8     nRBC 01/08/2025 0.0     RBC 01/08/2025 4.79     Hemoglobin 01/08/2025 14.0     Hematocrit 01/08/2025 42.0     MCV 01/08/2025 88     MCH 01/08/2025 29.2     MCHC 01/08/2025 33.3     RDW 01/08/2025 13.1     Platelets 01/08/2025 237     Neutrophils % 01/08/2025 61.4     Immature Granulocytes %,* 01/08/2025 0.5      Lymphocytes % 01/08/2025 27.6     Monocytes % 01/08/2025 7.0     Eosinophils % 01/08/2025 2.2     Basophils % 01/08/2025 1.3     Neutrophils Absolute 01/08/2025 4.80     Immature Granulocytes Ab* 01/08/2025 0.04     Lymphocytes Absolute 01/08/2025 2.16     Monocytes Absolute 01/08/2025 0.55     Eosinophils Absolute 01/08/2025 0.17     Basophils Absolute 01/08/2025 0.10    Office Visit on 01/07/2025   Component Date Value    POC Color, Urine 01/07/2025 Yellow     POC Appearance, Urine 01/07/2025 Clear     POC Glucose, Urine 01/07/2025 NEGATIVE     POC Bilirubin, Urine 01/07/2025 NEGATIVE     POC Ketones, Urine 01/07/2025 NEGATIVE     POC Specific Gravity, Ur* 01/07/2025 >=1.030     POC Blood, Urine 01/07/2025 NEGATIVE     POC PH, Urine 01/07/2025 6.0     POC Protein, Urine 01/07/2025 NEGATIVE     POC Urobilinogen, Urine 01/07/2025 0.2     Poc Nitrite, Urine 01/07/2025 NEGATIVE     POC Leukocytes, Urine 01/07/2025 NEGATIVE        Assessment/Plan   Psychiatric Risk Assessment  Violence Risk Assessment: none  Acute Risk of Harm to Others is Considered: low   Suicide Risk Assessment: age > 65 yrs old and   Protective Factors against Suicide: adherence to  treatment, fear of suicide, moral objections to suicide, positive family relationships, and sense of responsibility toward family  Acute Risk of Harm to Self is Considered: low    Imminent Risk of Suicide or Serious Self-Injury: Low   Chronic Risk of Suicide of Serious Self-Injury: Low  Risk factors: Age, depression history and   Protective factors: Denies current suicidal ideation, denies history of suicide attempts , willingness to seek help and support , gender, access to a variety of clinical interventions , and receiving and engaged in care for mental, physical, and substance use disorders      Imminent Risk of Violence or Homicide: Low   Risk Factors: No significant risk factors identified on screening  Protective Factors: Lack of known history of harm to  others , Lack of known history of violent ideation , and lack of known access to firearms.     Assessment & Plan  Attention deficit hyperactivity disorder (ADHD), predominantly inattentive type  Your diagnosis is depression and anxiety complicated by a history of ADD in substantial remission.     Please follow up in July of 2025 and sooner virtually if needed as discussed today. For all urgent or emergency matters please call 911, go to urgent care or the emergency department of the nearest hospital.     Urine toxicology screens and controlled substances agreements were completed at this appointment.     [place]AR[place] was reviewed today with no concerning findings evidenced.     You can continue aripiprazole 5 mg daily, duloxetine 60 mg daily and methylphenidate 20 mg once daily. You a clearly are able to recite back the risks, benefits, potential interactions and alternatives of these medications as discussed with you today.     The FDA risks of antidepressants including increased risk of suicide, cardiotoxicity, weight gain, lowered seizure threshold, the serotonin syndrome with serotonin agents, individually or with multiple serotonin agent interaction and anticholinergic effects have been discussed. We also discussed that at least in the case of SSRIs there is interference with warfarin and nonsteroidal inflammatories and can cause increased bleeding and hemorrhage. We reviewed the risks of agranulocytosis and neutropenia with sertraline and mirtazapine and that risk according to [employer] data is about 0.1% for both mirtazapine and sertraline. In the case of duloxetine we reviewed precautions, morbidity and mortality, regarding its application in hepatic or renal disease secondary to its metabolism by those organs. You clearly were able to recite back the risks, benefits and alternatives to the antidepressants discussed with you today.     The FDA risks of antipsychotics including increased risk of sudden death, heart  attack, brain stroke, irreversible neurological movement disorders, parkinsonism, cardiac toxicity, weight gain and diabetes of this medication were discussed. You clearly were able to recite the risks, benefits and alternatives of the antipsychotics discussed with you today.     The FDA risks of amphetamines including heart attack, abuse potential and drug interactions were discussed. You clearly were able to recite back to me the risks, benefits and alternatives to the amphetamines as discussed with you today.     Follow up in July of 2025.       Mixed anxiety depressive disorder  Your diagnosis is depression and anxiety complicated by a history of ADD in substantial remission.     Please follow up in July of 2025 and sooner virtually if needed as discussed today. For all urgent or emergency matters please call 911, go to urgent care or the emergency department of the nearest hospital.     Urine toxicology screens and controlled substances agreements were completed at this appointment.     OARRS was reviewed today with no concerning findings evidenced.     You can continue aripiprazole 5 mg daily, duloxetine 60 mg daily and methylphenidate 20 mg once daily. You a clearly are able to recite back the risks, benefits, potential interactions and alternatives of these medications as discussed with you today.     The FDA risks of antidepressants including increased risk of suicide, cardiotoxicity, weight gain, lowered seizure threshold, the serotonin syndrome with serotonin agents, individually or with multiple serotonin agent interaction and anticholinergic effects have been discussed. We also discussed that at least in the case of SSRIs there is interference with warfarin and nonsteroidal inflammatories and can cause increased bleeding and hemorrhage. We reviewed the risks of agranulocytosis and neutropenia with sertraline and mirtazapine and that risk according to [employer] data is about 0.1% for both mirtazapine  and sertraline. In the case of duloxetine we reviewed precautions, morbidity and mortality, regarding its application in hepatic or renal disease secondary to its metabolism by those organs. You clearly were able to recite back the risks, benefits and alternatives to the antidepressants discussed with you today.     The FDA risks of antipsychotics including increased risk of sudden death, heart attack, brain stroke, irreversible neurological movement disorders, parkinsonism, cardiac toxicity, weight gain and diabetes of this medication were discussed. You clearly were able to recite the risks, benefits and alternatives of the antipsychotics discussed with you today.     The FDA risks of amphetamines including heart attack, abuse potential and drug interactions were discussed. You clearly were able to recite back to me the risks, benefits and alternatives to the amphetamines as discussed with you today.     Follow up in July of 2025.         Time:   Prep time on date of the patient encounter: 5 minutes.   Time spent directly with patient/family/caregiver: 20 minutes.   Additional time spent on patient care activities:  minutes.   Documentation time: 5 minutes.   Total time on date of patient encounter: 30 minutes.

## 2025-05-01 ENCOUNTER — APPOINTMENT (OUTPATIENT)
Dept: PRIMARY CARE | Facility: CLINIC | Age: 55
End: 2025-05-01
Payer: COMMERCIAL

## 2025-05-02 ENCOUNTER — OFFICE VISIT (OUTPATIENT)
Dept: PRIMARY CARE | Facility: CLINIC | Age: 55
End: 2025-05-02
Payer: COMMERCIAL

## 2025-05-02 VITALS
HEART RATE: 80 BPM | DIASTOLIC BLOOD PRESSURE: 68 MMHG | BODY MASS INDEX: 31.45 KG/M2 | WEIGHT: 189 LBS | SYSTOLIC BLOOD PRESSURE: 102 MMHG | TEMPERATURE: 97.8 F | RESPIRATION RATE: 18 BRPM | OXYGEN SATURATION: 98 %

## 2025-05-02 DIAGNOSIS — G89.29 CHRONIC PAIN OF LEFT KNEE: Primary | ICD-10-CM

## 2025-05-02 DIAGNOSIS — M25.562 CHRONIC PAIN OF LEFT KNEE: Primary | ICD-10-CM

## 2025-05-02 PROCEDURE — 3078F DIAST BP <80 MM HG: CPT

## 2025-05-02 PROCEDURE — 99213 OFFICE O/P EST LOW 20 MIN: CPT

## 2025-05-02 PROCEDURE — 3074F SYST BP LT 130 MM HG: CPT

## 2025-05-02 NOTE — PROGRESS NOTES
Subjective   Patient ID: Adán Liu is a 54 y.o. male who presents for Knee Pain (Left knee pain).    HPI   Patient here today for L knee injury  Remote injury that occurred >10 years prior. Patient was working for [employer] at the time, states he had slipped and fallen on ice in a parking lot  Did not land on knee however t was placed in a awkward  Has   Review of Systems    Objective   /68 (BP Location: Left arm, Patient Position: Sitting)   Pulse 80   Temp 36.6 °C (97.8 °F)   Resp 18   Wt 85.7 kg (189 lb)   SpO2 98%   BMI 31.45 kg/m²     Physical Exam    Assessment/Plan   {Assess/PlanSmartLinks:10725}

## 2025-05-08 ENCOUNTER — APPOINTMENT (OUTPATIENT)
Dept: PRIMARY CARE | Facility: CLINIC | Age: 55
End: 2025-05-08
Payer: COMMERCIAL

## 2025-05-15 ENCOUNTER — TELEPHONE (OUTPATIENT)
Dept: BEHAVIORAL HEALTH | Facility: CLINIC | Age: 55
End: 2025-05-15
Payer: COMMERCIAL

## 2025-05-15 DIAGNOSIS — F90.0 ATTENTION DEFICIT HYPERACTIVITY DISORDER (ADHD), PREDOMINANTLY INATTENTIVE TYPE: ICD-10-CM

## 2025-05-15 DIAGNOSIS — F98.8 ATTENTION DEFICIT DISORDER (ADD) WITHOUT HYPERACTIVITY: ICD-10-CM

## 2025-05-15 RX ORDER — METHYLPHENIDATE HYDROCHLORIDE 20 MG/1
20 TABLET ORAL 2 TIMES DAILY
Qty: 60 TABLET | Refills: 0 | Status: SHIPPED | OUTPATIENT
Start: 2025-05-15 | End: 2025-06-14

## 2025-05-15 NOTE — PROGRESS NOTES
Refill Request    Methylphenidate (Ritalin) 20 mg tablet.Take 1 tablet (20 mg) by mouth 2 times a day.     Ph[facility]: [id number] [address] [facility] [address]  [id number] [address] [id number]  Phone: [phone]      Next Appt: 7/3/2025

## 2025-05-20 ENCOUNTER — HOSPITAL ENCOUNTER (OUTPATIENT)
Dept: RADIOLOGY | Facility: CLINIC | Age: 55
Discharge: HOME | End: 2025-05-20
Payer: COMMERCIAL

## 2025-05-20 ENCOUNTER — OFFICE VISIT (OUTPATIENT)
Dept: ORTHOPEDIC SURGERY | Facility: CLINIC | Age: 55
End: 2025-05-20
Payer: COMMERCIAL

## 2025-05-20 VITALS — WEIGHT: 185 LBS | BODY MASS INDEX: 30.82 KG/M2 | HEIGHT: 65 IN

## 2025-05-20 DIAGNOSIS — G89.29 CHRONIC PAIN OF LEFT KNEE: ICD-10-CM

## 2025-05-20 DIAGNOSIS — M25.562 CHRONIC PAIN OF LEFT KNEE: ICD-10-CM

## 2025-05-20 PROCEDURE — 1036F TOBACCO NON-USER: CPT | Performed by: ORTHOPAEDIC SURGERY

## 2025-05-20 PROCEDURE — 73564 X-RAY EXAM KNEE 4 OR MORE: CPT | Mod: LT

## 2025-05-20 PROCEDURE — 99204 OFFICE O/P NEW MOD 45 MIN: CPT | Performed by: ORTHOPAEDIC SURGERY

## 2025-05-20 PROCEDURE — 73564 X-RAY EXAM KNEE 4 OR MORE: CPT | Mod: LEFT SIDE | Performed by: RADIOLOGY

## 2025-05-20 PROCEDURE — 3008F BODY MASS INDEX DOCD: CPT | Performed by: ORTHOPAEDIC SURGERY

## 2025-05-20 PROCEDURE — 99213 OFFICE O/P EST LOW 20 MIN: CPT | Performed by: ORTHOPAEDIC SURGERY

## 2025-05-20 NOTE — PROGRESS NOTES
History of Present Illness:   The patient presents today endorsing left knee pain. The pain localizes over the medial joint line.  The patient denies any recent trauma and notes the insidious onset of pain.  The pain is achy, constant, worse with activity and better with rest. The patient notes occasional locking, catching and giving out.  The patient has tried the following modalities: Rest, ice, anti-inflammatories, physical therapy home exercise program.    He works as a .  Does several workout classes a week but has been limited by the knee recently    Medical History[1]  Surgical History[2]  Current Medications[3]    Review of Systems   GENERAL: Negative for malaise, significant weight loss, fever  MUSCULOSKELETAL: See HPI  NEURO:  Negative for numbness / tingling     Physical Examination:  Left Knee:  Skin healthy and intact  No gross swelling or ecchymosis  No significant varus or valgus malalignment  Effusion: absent    ROM:  Full flexion   Full extension  No pain with internal rotation of the hip  Tenderness to palpation:  medial joint line   No laxity to valgus stress  No laxity to varus stress  Negative Lachman´s test  Negative anterior drawer test  Negative posterior drawer test  Positive Katie´s test  Neurovascular exam normal distally    Imaging:  Mild narrowing medial compartment    Assessment:    Patient with left knee pain concern for meniscal tear      Plan:  We discussed with the patient concern for a meniscal tear.   We reviewed the natural history of meniscal pathology and discussed the implications for the health of the joint.  Various treatment options were discussed and the patient elected for MRI based on failure to improve with multiple modalities in almost a decade of pain.                         [1]   Past Medical History:  Diagnosis Date    Depression 1991    GERD (gastroesophageal reflux disease) 2000    Personal history of other mental and behavioral disorders 11/18/2022     History of major depression   [2]   Past Surgical History:  Procedure Laterality Date    CT ABDOMEN PELVIS ANGIOGRAM W AND/OR WO IV CONTRAST  9/24/2023    CT ABDOMEN PELVIS ANGIOGRAM W AND/OR WO IV CONTRAST 9/24/2023 [facility]   [3]   Current Outpatient Medications:     ARIPiprazole (Abilify) 5 mg tablet, Take 1 tablet (5 mg) by mouth once daily., Disp: 90 tablet, Rfl: 1    aspirin 81 mg EC tablet, TAKE 1 TABLET BY MOUTH ONCE DAILY., Disp: 90 tablet, Rfl: 3    atorvastatin (Lipitor) 40 mg tablet, Take 1 tablet (40 mg) by mouth once daily., Disp: 90 tablet, Rfl: 3    DULoxetine (Cymbalta) 60 mg DR capsule, Take 1 capsule (60 mg) by mouth once daily. Do not crush or chew., Disp: 90 capsule, Rfl: 1    lisinopril 10 mg tablet, TAKE 1 TABLET BY MOUTH ONCE DAILY., Disp: 90 tablet, Rfl: 3    methylphenidate (Ritalin) 20 mg tablet, Take 1 tablet (20 mg) by mouth 2 times a day., Disp: 60 tablet, Rfl: 0    omeprazole OTC (PriLOSEC OTC) 20 mg EC tablet, Take 1 tablet (20 mg) by mouth once daily in the morning. Take before meals. Do not crush, chew, or split., Disp: , Rfl:

## 2025-06-12 DIAGNOSIS — F98.8 ATTENTION DEFICIT DISORDER (ADD) WITHOUT HYPERACTIVITY: ICD-10-CM

## 2025-06-12 RX ORDER — METHYLPHENIDATE HYDROCHLORIDE 20 MG/1
20 TABLET ORAL 2 TIMES DAILY
Qty: 60 TABLET | Refills: 0 | Status: SHIPPED | OUTPATIENT
Start: 2025-06-13 | End: 2025-07-13

## 2025-06-19 ENCOUNTER — APPOINTMENT (OUTPATIENT)
Dept: RADIOLOGY | Facility: CLINIC | Age: 55
End: 2025-06-19
Payer: COMMERCIAL

## 2025-06-19 DIAGNOSIS — E55.9 VITAMIN D DEFICIENCY: Primary | ICD-10-CM

## 2025-06-19 DIAGNOSIS — M25.562 CHRONIC PAIN OF LEFT KNEE: ICD-10-CM

## 2025-06-19 DIAGNOSIS — G89.29 CHRONIC PAIN OF LEFT KNEE: ICD-10-CM

## 2025-06-19 PROCEDURE — 73721 MRI JNT OF LWR EXTRE W/O DYE: CPT | Mod: LT

## 2025-06-19 RX ORDER — ERGOCALCIFEROL 1.25 MG/1
50000 CAPSULE ORAL
Qty: 4 CAPSULE | Refills: 1 | Status: SHIPPED | OUTPATIENT
Start: 2025-06-22 | End: 2025-08-21

## 2025-06-26 ENCOUNTER — OFFICE VISIT (OUTPATIENT)
Dept: ORTHOPEDIC SURGERY | Facility: CLINIC | Age: 55
End: 2025-06-26
Payer: COMMERCIAL

## 2025-06-26 ENCOUNTER — PREP FOR PROCEDURE (OUTPATIENT)
Dept: ORTHOPEDIC SURGERY | Facility: CLINIC | Age: 55
End: 2025-06-26

## 2025-06-26 DIAGNOSIS — S83.249D ACUTE MEDIAL MENISCUS TEAR, UNSPECIFIED LATERALITY, SUBSEQUENT ENCOUNTER: Primary | ICD-10-CM

## 2025-06-26 PROBLEM — S83.242A OTHER TEAR OF MEDIAL MENISCUS, CURRENT INJURY, LEFT KNEE, INITIAL ENCOUNTER: Status: ACTIVE | Noted: 2025-06-26

## 2025-06-26 PROCEDURE — 99202 OFFICE O/P NEW SF 15 MIN: CPT | Performed by: ORTHOPAEDIC SURGERY

## 2025-06-26 PROCEDURE — 99214 OFFICE O/P EST MOD 30 MIN: CPT | Performed by: ORTHOPAEDIC SURGERY

## 2025-06-26 NOTE — PROGRESS NOTES
"      History of Present Illness:   Patient returns today to review MRI.  The patient endorses persistent knee pain and persistent mechanical symptoms including locking, catching, and giving out.  These issues are refractory to multiple non-surgical treatments.  Currently works as a , very physically active.  He has a history of abdominal aortic dissection, does take aspirin once per day, sees a vascular provider.    Review of Systems   GENERAL: Negative for malaise, significant weight loss, fever  MUSCULOSKELETAL: See HPI  NEURO:  Negative for numbness / tingling     Physical Examination:  Left Knee:  Skin healthy and intact  No gross varus or valgus alignment   Range of motion: no major deficits   Tenderness to palpation over joint line: medial joint line  No laxity to valgus stress  No laxity to varus stress  Negative Lachman´s test  Negative anterior drawer test  Negative posterior drawer test  Positive Katie´s test  Neurovascular exam normal distally    Imaging  MRI: \"1. Complex tearing of the body and posterior horn segments of the  medial meniscus as described above.  2. Overall mild-to-moderate medial and mild patellofemoral  compartment osteoarthrosis with chondral loss as described above.  3. Small volume popliteal fossa cyst and small knee joint effusion.\"    The remainder of the knee is well-preserved, minimal DJD, no evidence of internal derangement otherwise.    Assessment:    Patient with a left knee medial meniscal tear in the setting of underlying medial compartment DJD.    Plan:  We discussed arthroscopy versus nonsurgical treatment for the patient´s meniscal tear.  We reviewed the blood supply to the meniscus, limited healing potential and meniscectomy versus meniscal repair.  The risks of the procedure were mentioned, including wound issues, deep venous thrombosis, pulmonary embolism and medical complications.  The anticipated timeframe for recovery and return to activity were outlined.  " We discussed formal physical therapy versus home exercise program.    We mentioned the possibility of incomplete relief of pain, particularly if any chondral defects are encountered and the possibility of arthrosis of the knee related to long-term deficiencies of the meniscus.  Ultimately reviewed with the patient that he may need vascular clearance in the future for larger procedure however for a small meniscectomy, recommended surgery at the hospital instead of surgery center, as well as anti-inflammatories sparingly, recommended aspirin after surgery.    Earl Richey PA-C       In a face to face encounter, I evaluated the patient and performed a physical examination, discussed pertinent diagnostic studies if indicated and discussed diagnosis and management strategies with both the patient and physician assistant / nurse practitioner.  I reviewed the PA/NP's note and agree with the documented findings and plan of care.        Pj Burr MD

## 2025-07-01 ENCOUNTER — HOSPITAL ENCOUNTER (OUTPATIENT)
Dept: CARDIOLOGY | Facility: CLINIC | Age: 55
Discharge: HOME | End: 2025-07-01
Payer: COMMERCIAL

## 2025-07-01 DIAGNOSIS — S83.242A OTHER TEAR OF MEDIAL MENISCUS, CURRENT INJURY, LEFT KNEE, INITIAL ENCOUNTER: ICD-10-CM

## 2025-07-01 PROCEDURE — 93005 ELECTROCARDIOGRAM TRACING: CPT

## 2025-07-02 ENCOUNTER — TELEPHONE (OUTPATIENT)
Dept: ORTHOPEDIC SURGERY | Facility: CLINIC | Age: 55
End: 2025-07-02
Payer: COMMERCIAL

## 2025-07-03 ENCOUNTER — APPOINTMENT (OUTPATIENT)
Dept: BEHAVIORAL HEALTH | Facility: CLINIC | Age: 55
End: 2025-07-03
Payer: COMMERCIAL

## 2025-07-03 VITALS
WEIGHT: 183.3 LBS | BODY MASS INDEX: 30.54 KG/M2 | HEART RATE: 85 BPM | HEIGHT: 65 IN | RESPIRATION RATE: 18 BRPM | DIASTOLIC BLOOD PRESSURE: 64 MMHG | SYSTOLIC BLOOD PRESSURE: 101 MMHG

## 2025-07-03 DIAGNOSIS — F98.8 ATTENTION DEFICIT DISORDER (ADD) WITHOUT HYPERACTIVITY: ICD-10-CM

## 2025-07-03 DIAGNOSIS — F31.9 BIPOLAR AND RELATED DISORDER (MULTI): ICD-10-CM

## 2025-07-03 PROCEDURE — 1036F TOBACCO NON-USER: CPT | Performed by: PSYCHIATRY & NEUROLOGY

## 2025-07-03 PROCEDURE — 3008F BODY MASS INDEX DOCD: CPT | Performed by: PSYCHIATRY & NEUROLOGY

## 2025-07-03 PROCEDURE — 3074F SYST BP LT 130 MM HG: CPT | Performed by: PSYCHIATRY & NEUROLOGY

## 2025-07-03 PROCEDURE — 3078F DIAST BP <80 MM HG: CPT | Performed by: PSYCHIATRY & NEUROLOGY

## 2025-07-03 PROCEDURE — 99214 OFFICE O/P EST MOD 30 MIN: CPT | Performed by: PSYCHIATRY & NEUROLOGY

## 2025-07-03 RX ORDER — METHYLPHENIDATE HYDROCHLORIDE 20 MG/1
20 TABLET ORAL 2 TIMES DAILY
Qty: 60 TABLET | Refills: 0 | Status: SHIPPED | OUTPATIENT
Start: 2025-07-15 | End: 2025-08-14

## 2025-07-03 RX ORDER — DULOXETIN HYDROCHLORIDE 60 MG/1
60 CAPSULE, DELAYED RELEASE ORAL DAILY
Qty: 90 CAPSULE | Refills: 1 | Status: SHIPPED | OUTPATIENT
Start: 2025-07-03 | End: 2025-12-30

## 2025-07-03 RX ORDER — CEFAZOLIN SODIUM 2 G/100ML
2 INJECTION, SOLUTION INTRAVENOUS ONCE
OUTPATIENT
Start: 2025-07-03 | End: 2025-07-03

## 2025-07-03 RX ORDER — ARIPIPRAZOLE 5 MG/1
5 TABLET ORAL DAILY
Qty: 90 TABLET | Refills: 1 | Status: SHIPPED | OUTPATIENT
Start: 2025-07-03 | End: 2025-12-30

## 2025-07-03 ASSESSMENT — PAIN SCALES - GENERAL: PAINLEVEL_OUTOF10: 0-NO PAIN

## 2025-07-03 NOTE — H&P
History Of Present Illness  Adán Liu is a 55 y.o. male presenting with left knee pain, medial meniscus tear.     Past Medical History  He has a past medical history of ADD (attention deficit disorder) (1995), Aortic dissection (Multi) (2023), Depression (1991), GERD (gastroesophageal reflux disease) (2000), and Personal history of other mental and behavioral disorders (11/18/2022).    Surgical History  He has a past surgical history that includes CT angio abdomen pelvis w and or wo IV IV contrast (9/24/2023) and No past surgeries.     Social History  He reports that he has never smoked. He has never used smokeless tobacco. He reports that he does not currently use alcohol. He reports that he does not use drugs.    Family History  Family History[1]     Allergies  Latex    Review of Systems CONSTITUTIONAL: Denies weight loss, fever and chills.    - HEENT: Denies changes in vision and hearing.    - RESPIRATORY: Denies SOB and cough.    - CV: Denies palpitations and CP.    - GI: Denies abdominal pain, nausea, vomiting and diarrhea.    - : Denies dysuria and urinary frequency.    - MSK: See physical exam findings     - SKIN: Denies rash and pruritus.    - NEUROLOGICAL: Denies headache and syncope.    - PSYCHIATRIC: Denies recent changes in mood. Denies anxiety and depression.     Physical Exam- GENERAL: Alert and oriented x 3. No acute distress. Well-nourished.    - EYES: EOMI. Anicteric.    - HENT: Moist mucous membranes. No scleral icterus. No cervical lymphadenopathy.    - LUNGS: Clear to auscultation bilaterally. No accessory muscle use.    - CARDIOVASCULAR: Regular rate and rhythm. No murmur. No JVD.    - ABDOMEN: Soft, non-tender and non-distended. No palpable masses.    - EXTREMITIES: Positive Katie's test    - NEUROLOGIC: No focal neurological deficits. CN II-XII grossly intact, but not individually tested.    - PSYCHIATRIC: Cooperative. Appropriate mood and affect.     Last Recorded Vitals  There were  no vitals taken for this visit.    Relevant Results      Scheduled medications  Scheduled Medications[2]  Continuous medications  Continuous Medications[3]  PRN medications  PRN Medications[4]  No results found for this or any previous visit (from the past 24 hours).    Assessment/Plan   Assessment & Plan  Other tear of medial meniscus, current injury, left knee, initial encounter      Discussed left knee arthroscopic partial medial meniscectomy, patient agreeable to proceed.       I spent 10 minutes in the professional and overall care of this patient.      Earl Richey PA-C         [1]   Family History  Problem Relation Name Age of Onset    Diabetes Father Franklin Liu     Heart disease Father Franklin Liu     Hernia Father Franklinaster Liu     Hyperlipidemia Father Franklin Noe     Cancer Maternal Grandmother Teresa Alfa     Cancer Mother's Sister Delaney Lepe    [2] [3] [4]

## 2025-07-03 NOTE — PROGRESS NOTES
[facility] Nurse Note    Name: Adán Liu  MRN: [id number]  YOB: 1970    Date: 07/03/25    Reason for Visit:  No chief complaint on file.    Vitals:       Problem List[1]    Allergies[2]    Encounter Medications[3]    Progress:             [1]   Patient Active Problem List  Diagnosis    ADHD (attention deficit hyperactivity disorder)    Dissection of mesenteric artery (Multi)    Primary hypertension    Mixed anxiety depressive disorder    Gastroesophageal reflux disease    Vitamin D deficiency    Other tear of medial meniscus, current injury, left knee, initial encounter   [2]   Allergies  Allergen Reactions    Latex Hives   [3]   Outpatient Encounter Medications as of 7/3/2025   Medication Sig Dispense Refill    ARIPiprazole (Abilify) 5 mg tablet Take 1 tablet (5 mg) by mouth once daily. 90 tablet 1    aspirin 81 mg EC tablet TAKE 1 TABLET BY MOUTH ONCE DAILY. 90 tablet 3    atorvastatin (Lipitor) 40 mg tablet Take 1 tablet (40 mg) by mouth once daily. 90 tablet 3    DULoxetine (Cymbalta) 60 mg DR capsule Take 1 capsule (60 mg) by mouth once daily. Do not crush or chew. 90 capsule 1    ergocalciferol (Vitamin D2) 1250 mcg (50,000 units) capsule Take 1 capsule (50,000 Units) by mouth 1 (one) time per week. 4 capsule 1    lisinopril 10 mg tablet TAKE 1 TABLET BY MOUTH ONCE DAILY. 90 tablet 3    methylphenidate (Ritalin) 20 mg tablet Take 1 tablet (20 mg) by mouth 2 times a day. Do not fill before June 13, 2025. 60 tablet 0    omeprazole OTC (PriLOSEC OTC) 20 mg EC tablet Take 1 tablet (20 mg) by mouth once daily in the morning. Take before meals. Do not crush, chew, or split.       No facility-administered encounter medications on file as of 7/3/2025.

## 2025-07-03 NOTE — PROGRESS NOTES
Subjective   Patient ID: Adán Liu is a 55 y.o. male who presents for No chief complaint on file. I am doing well.     HPI: The patient is doing well and continues to work.     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   Inpatient history: None   Suicide attempts/Self-Harm/Ideation History: None   Current providers: None   Past providers: Dr. Kaufman.   Past medication trials: Aripiprazole, duloxetine and methylphernidate.      Family History  Mother    · Family history of depression (V17.0) (Z81.8)   · No pertinent family history   · Family history of Other depression  Paternal Great Grandmother    · Family history of dementia (V17.2) (Z81.8)     Social History  Problems    · Born in [place]   · Completed college, associates degree   · Has a degree in [employer].   · Completed elementary school   · Completed high school   · Employed   · Employed as a  currently.   · Has no children   · Monogamous relationship   · Never    · Never smoker     Allergies  Medication    · No Known Drug Allergies     Current Meds     Medication NameInstruction  ARIPiprazole 5 MG Oral Tablet (Abilify)TAKE ONE TABLET BY MOUTH EVERY DAY  DULoxetine HCl - 60 MG Oral Capsule Delayed Release ParticlesTAKE ONE CAPSULE BY MOUTH EVERY DAY  Methylphenidate HCl - 20 MG Oral TabletTake 1 two times a day     Mental Status Exam     General: Appropriately groomed and dressed   Appearance: Appears stated age   Attitude: Calm,  cooperative   Behavior: Appropriate eye contact.   Motor Activity: No agitation or retardation. No EPS/TD. Normal gait and station.   Speech: Regular rate, rhythm, volume and tone, spontaneous, fluent.   Mood: Euthymic   Affect: Appropriate with full range   Thought Process: Organized, linear, goal directed. Associations are logical.   Thought Content: Does not endorse suicidal or homicidal ideation, no delusions elicited.   Thought Perception: Does not endorse auditory or visual hallucinations, does not appear to be responding to hallucinatory stimuli.   Cognition: Alert, oriented x3. No deficits noted. Adequate fund of knowledge. No deficit in recent and remote memory. No deficits in attention, concentration or language.    Insight: Good, as patient recognizes symptoms of illness and need for recommended treatments.   Judgment: Can make reasonable decisions about ordinary activities of daily living and necessary medical care recommendations.         Appearance: well-groomed.   Build: average.   Demeanor: average.   Eye Contact: average.   Motor Activity: average.   Speech: clear.   Language: Neurologic language is intact.   Fund of Knowledge: fair fund of knowledge.   Delusions: None Reported.   Self Harm: None Reported.   Aggressive: None Reported.   Mood: euthymic.   Affect: full.   Orientation: alert, oriented x3.   Manner: cooperative.   Thought process: goal-directed.   Thought association: displays rational thought process.   Content of thought: Mr. JAEL BRENNER denies any suicidal or homicidal ideation or plans.   Abstract/ Rational Thought: Intact   Memory: Grossly intact.   Behavior: Calm.   Attention/Concentration: Normal.   Cognition: intact.   Intelligence Estimate: Average.   Executive Function: Intact.   Insight: Intact.   Judgement: Intact.         Review of Systems   Neurological:         Mental Status Exam     General: Appropriately groomed and dressed   Appearance: Appears stated age   Attitude:  Calm, cooperative   Behavior: Appropriate eye contact.   Motor Activity: No agitation or retardation. No EPS/TD. Normal gait and station.   Speech: Regular rate, rhythm, volume and tone, spontaneous, fluent.   Mood: Euthymic   Affect: Appropriate with full range   Thought Process: Organized, linear, goal directed. Associations are logical.   Thought Content: Does not endorse suicidal or homicidal ideation, no delusions elicited.   Thought Perception: Does not endorse auditory or visual hallucinations, does not appear to be responding to hallucinatory stimuli.   Cognition: Alert, oriented x3. No deficits noted. Adequate fund of knowledge. No deficit in recent and remote memory. No deficits in attention, concentration or language.    Insight: Good, as patient recognizes symptoms of illness and need for recommended treatments.   Judgment: Can make reasonable decisions about ordinary activities of daily living and necessary medical care recommendations.         Appearance: well-groomed.   Build: average.   Demeanor: average.   Eye Contact: average.   Motor Activity: average.   Speech: clear.   Language: Neurologic language is intact.   Fund of Knowledge: fair fund of knowledge.   Delusions: None Reported.   Self Harm: None Reported.   Aggressive: None Reported.   Mood: euthymic.   Affect: full.   Orientation: alert, oriented x3.   Manner: cooperative.   Thought process: goal-directed.   Thought association: displays rational thought process.   Content of thought: Mr. JAEL BRENNER denies any suicidal or homicidal ideation or plans.   Abstract/ Rational Thought: Intact   Memory: Grossly intact.   Behavior: Calm.   Attention/Concentration: Normal.   Cognition: intact.   Intelligence Estimate: Average.   Executive Function: Intact.   Insight: Intact.   Judgement: Intact.      Psychiatric/Behavioral:          Mental Status Exam     General: Appropriately groomed and dressed   Appearance: Appears stated age   Attitude: Calm,  cooperative   Behavior: Appropriate eye contact.   Motor Activity: No agitation or retardation. No EPS/TD. Normal gait and station.   Speech: Regular rate, rhythm, volume and tone, spontaneous, fluent.   Mood: Euthymic   Affect: Appropriate with full range   Thought Process: Organized, linear, goal directed. Associations are logical.   Thought Content: Does not endorse suicidal or homicidal ideation, no delusions elicited.   Thought Perception: Does not endorse auditory or visual hallucinations, does not appear to be responding to hallucinatory stimuli.   Cognition: Alert, oriented x3. No deficits noted. Adequate fund of knowledge. No deficit in recent and remote memory. No deficits in attention, concentration or language.    Insight: Good, as patient recognizes symptoms of illness and need for recommended treatments.   Judgment: Can make reasonable decisions about ordinary activities of daily living and necessary medical care recommendations.         Appearance: well-groomed.   Build: average.   Demeanor: average.   Eye Contact: average.   Motor Activity: average.   Speech: clear.   Language: Neurologic language is intact.   Fund of Knowledge: fair fund of knowledge.   Delusions: None Reported.   Self Harm: None Reported.   Aggressive: None Reported.   Mood: euthymic.   Affect: full.   Orientation: alert, oriented x3.   Manner: cooperative.   Thought process: goal-directed.   Thought association: displays rational thought process.   Content of thought: Mr. JAEL BRENNER denies any suicidal or homicidal ideation or plans.   Abstract/ Rational Thought: Intact   Memory: Grossly intact.   Behavior: Calm.   Attention/Concentration: Normal.   Cognition: intact.   Intelligence Estimate: Average.   Executive Function: Intact.   Insight: Intact.   Judgement: Intact.      All other systems reviewed and are negative.    Psych Review of Symptoms:    ADHD: Patient denied any symptoms.         Anxiety: Patient denied any  symptoms.         Developmental and Sensory Concerns: Patient denied any symptoms.         Depressive Symptoms: Patient denied any symptoms.         Disruptive and Conduct Symptoms: Patient denied any symptoms.         Eating / Feeding Concerns: Patient denied any symptoms.         Elimination Symptoms: Patient denied any symptoms.         Manic Symptoms: Patient denied any symptoms.         Obsessive-Compulsive Symptoms: Patient denied any symptoms.         Psychotic Symptoms: Patient denied any symptoms.           Trauma Related Symptoms: Patient denied any symptoms.           Sleep Concerns: Patient denied any symptoms.             Objective   Physical Exam  Neurological:      Mental Status: He is alert.      Comments: Mental Status Exam     General: Appropriately groomed and dressed   Appearance: Appears stated age   Attitude: Calm, cooperative   Behavior: Appropriate eye contact.   Motor Activity: No agitation or retardation. No EPS/TD. Normal gait and station.   Speech: Regular rate, rhythm, volume and tone, spontaneous, fluent.   Mood: Euthymic   Affect: Appropriate with full range   Thought Process: Organized, linear, goal directed. Associations are logical.   Thought Content: Does not endorse suicidal or homicidal ideation, no delusions elicited.   Thought Perception: Does not endorse auditory or visual hallucinations, does not appear to be responding to hallucinatory stimuli.   Cognition: Alert, oriented x3. No deficits noted. Adequate fund of knowledge. No deficit in recent and remote memory. No deficits in attention, concentration or language.    Insight: Good, as patient recognizes symptoms of illness and need for recommended treatments.   Judgment: Can make reasonable decisions about ordinary activities of daily living and necessary medical care recommendations.         Appearance: well-groomed.   Build: average.   Demeanor: average.   Eye Contact: average.   Motor Activity: average.   Speech: clear.    Language: Neurologic language is intact.   Fund of Knowledge: fair fund of knowledge.   Delusions: None Reported.   Self Harm: None Reported.   Aggressive: None Reported.   Mood: euthymic.   Affect: full.   Orientation: alert, oriented x3.   Manner: cooperative.   Thought process: goal-directed.   Thought association: displays rational thought process.   Content of thought: Mr. JAEL BRENNER denies any suicidal or homicidal ideation or plans.   Abstract/ Rational Thought: Intact   Memory: Grossly intact.   Behavior: Calm.   Attention/Concentration: Normal.   Cognition: intact.   Intelligence Estimate: Average.   Executive Function: Intact.   Insight: Intact.   Judgement: Intact.      Psychiatric:         Mood and Affect: Mood normal.      Comments: Mental Status Exam     General: Appropriately groomed and dressed   Appearance: Appears stated age   Attitude: Calm, cooperative   Behavior: Appropriate eye contact.   Motor Activity: No agitation or retardation. No EPS/TD. Normal gait and station.   Speech: Regular rate, rhythm, volume and tone, spontaneous, fluent.   Mood: Euthymic   Affect: Appropriate with full range   Thought Process: Organized, linear, goal directed. Associations are logical.   Thought Content: Does not endorse suicidal or homicidal ideation, no delusions elicited.   Thought Perception: Does not endorse auditory or visual hallucinations, does not appear to be responding to hallucinatory stimuli.   Cognition: Alert, oriented x3. No deficits noted. Adequate fund of knowledge. No deficit in recent and remote memory. No deficits in attention, concentration or language.    Insight: Good, as patient recognizes symptoms of illness and need for recommended treatments.   Judgment: Can make reasonable decisions about ordinary activities of daily living and necessary medical care recommendations.         Appearance: well-groomed.   Build: average.   Demeanor: average.   Eye Contact: average.   Motor  Activity: average.   Speech: clear.   Language: Neurologic language is intact.   Fund of Knowledge: fair fund of knowledge.   Delusions: None Reported.   Self Harm: None Reported.   Aggressive: None Reported.   Mood: euthymic.   Affect: full.   Orientation: alert, oriented x3.   Manner: cooperative.   Thought process: goal-directed.   Thought association: displays rational thought process.   Content of thought: Mr. JAEL BRENNER denies any suicidal or homicidal ideation or plans.   Abstract/ Rational Thought: Intact   Memory: Grossly intact.   Behavior: Calm.   Attention/Concentration: Normal.   Cognition: intact.   Intelligence Estimate: Average.   Executive Function: Intact.   Insight: Intact.   Judgement: Intact.            Lab Review:   Hospital Outpatient Visit on 07/01/2025   Component Date Value    Ventricular Rate 07/01/2025 70     Atrial Rate 07/01/2025 70     WI Interval 07/01/2025 114     QRS Duration 07/01/2025 72     QT Interval 07/01/2025 384     QTC Calculation(Bazett) 07/01/2025 414     P Axis 07/01/2025 45     R Axis 07/01/2025 35     T [place] 07/01/2025 78     QRS Count 07/01/2025 11     Q Onset 07/01/2025 221     P Onset 07/01/2025 164     P Offset 07/01/2025 212     T Offset 07/01/2025 413     QTC Fredericia 07/01/2025 404    Transcribe Orders on 06/26/2025   Component Date Value    GLUCOSE 07/01/2025 103 (H)     UREA NITROGEN (BUN) 07/01/2025 12     CREATININE 07/01/2025 0.95     EGFR 07/01/2025 95     SODIUM 07/01/2025 139     POTASSIUM 07/01/2025 4.7     CHLORIDE 07/01/2025 101     CARBON DIOXIDE 07/01/2025 30     ELECTROLYTE BALANCE 07/01/2025 8     CALCIUM 07/01/2025 9.0     PROTEIN, TOTAL 07/01/2025 6.0 (L)     ALBUMIN 07/01/2025 4.0     BILIRUBIN, TOTAL 07/01/2025 0.7     ALKALINE PHOSPHATASE 07/01/2025 99     AST 07/01/2025 14     ALT 07/01/2025 19     WHITE BLOOD CELL COUNT 07/01/2025 9.0     RED BLOOD CELL COUNT 07/01/2025 4.68     HEMOGLOBIN 07/01/2025 13.8     HEMATOCRIT 07/01/2025 42.1      MCV 07/01/2025 90.0     MCH 07/01/2025 29.5     MCHC 07/01/2025 32.8     RDW 07/01/2025 13.0     PLATELET COUNT 07/01/2025 258     MPV 07/01/2025 10.7     ABSOLUTE NEUTROPHILS 07/01/2025 5,373     ABSOLUTE LYMPHOCYTES 07/01/2025 2,709     ABSOLUTE MONOCYTES 07/01/2025 666     ABSOLUTE EOSINOPHILS 07/01/2025 162     ABSOLUTE BASOPHILS 07/01/2025 90     NEUTROPHILS 07/01/2025 59.7     LYMPHOCYTES 07/01/2025 30.1     MONOCYTES 07/01/2025 7.4     EOSINOPHILS 07/01/2025 1.8     BASOPHILS 07/01/2025 1.0     PARTIAL THROMBOPLASTIN T* 07/01/2025 28     INR 07/01/2025 1.0     PT 07/01/2025 10.6    Lab on 01/08/2025   Component Date Value    Vitamin D, 25-Hydroxy, T* 01/08/2025 16 (L)     Glucose 01/08/2025 99     Sodium 01/08/2025 139     Potassium 01/08/2025 4.2     Chloride 01/08/2025 102     Bicarbonate 01/08/2025 27     Anion Gap 01/08/2025 14     Urea Nitrogen 01/08/2025 17     Creatinine 01/08/2025 0.97     eGFR 01/08/2025 >90     Calcium 01/08/2025 9.7     Albumin 01/08/2025 4.2     Alkaline Phosphatase 01/08/2025 115     Total Protein 01/08/2025 6.4     AST 01/08/2025 13     Bilirubin, Total 01/08/2025 0.5     ALT 01/08/2025 25     Thyroid Stimulating Horm* 01/08/2025 1.87     Cholesterol 01/08/2025 272 (H)     HDL-Cholesterol 01/08/2025 39.1     Cholesterol/HDL Ratio 01/08/2025 7.0     LDL Calculated 01/08/2025 200 (H)     VLDL 01/08/2025 33     Triglycerides 01/08/2025 164 (H)     Non HDL Cholesterol 01/08/2025 233 (H)     WBC 01/08/2025 7.8     nRBC 01/08/2025 0.0     RBC 01/08/2025 4.79     Hemoglobin 01/08/2025 14.0     Hematocrit 01/08/2025 42.0     MCV 01/08/2025 88     MCH 01/08/2025 29.2     MCHC 01/08/2025 33.3     RDW 01/08/2025 13.1     Platelets 01/08/2025 237     Neutrophils % 01/08/2025 61.4     Immature Granulocytes %,* 01/08/2025 0.5     Lymphocytes % 01/08/2025 27.6     Monocytes % 01/08/2025 7.0     Eosinophils % 01/08/2025 2.2     Basophils % 01/08/2025 1.3     Neutrophils Absolute 01/08/2025  4.80     Immature Granulocytes Ab* 01/08/2025 0.04     Lymphocytes Absolute 01/08/2025 2.16     Monocytes Absolute 01/08/2025 0.55     Eosinophils Absolute 01/08/2025 0.17     Basophils Absolute 01/08/2025 0.10    Office Visit on 01/07/2025   Component Date Value    POC Color, Urine 01/07/2025 Yellow     POC Appearance, Urine 01/07/2025 Clear     POC Glucose, Urine 01/07/2025 NEGATIVE     POC Bilirubin, Urine 01/07/2025 NEGATIVE     POC Ketones, Urine 01/07/2025 NEGATIVE     POC Specific Gravity, Ur* 01/07/2025 >=1.030     POC Blood, Urine 01/07/2025 NEGATIVE     POC PH, Urine 01/07/2025 6.0     POC Protein, Urine 01/07/2025 NEGATIVE     POC Urobilinogen, Urine 01/07/2025 0.2     Poc Nitrite, Urine 01/07/2025 NEGATIVE     POC Leukocytes, Urine 01/07/2025 NEGATIVE        Assessment/Plan   Psychiatric Risk Assessment  Violence Risk Assessment: none  Acute Risk of Harm to Others is Considered: low   Suicide Risk Assessment: age > 65 yrs old and   Protective Factors against Suicide: adherence to  treatment, fear of suicide, moral objections to suicide, positive family relationships, and sense of responsibility toward family  Acute Risk of Harm to Self is Considered: low    Imminent Risk of Suicide or Serious Self-Injury: Low   Chronic Risk of Suicide of Serious Self-Injury: Low  Risk factors: Age, depression history and   Protective factors: Denies current suicidal ideation, denies history of suicide attempts , willingness to seek help and support , gender, access to a variety of clinical interventions , and receiving and engaged in care for mental, physical, and substance use disorders      Imminent Risk of Violence or Homicide: Low   Risk Factors: No significant risk factors identified on screening  Protective Factors: Lack of known history of harm to others , Lack of known history of violent ideation , and lack of known access to firearms.     Assessment & Plan  Bipolar and related disorder (Multi)  Your  diagnosis is depression and anxiety complicated by a history of ADD in substantial remission.     Treatment Plan:   Please follow up in October of 2025 and sooner virtually if needed as discussed today. For all urgent or emergency matters please call 911, go to urgent care or the emergency department of the nearest hospital.     Urine toxicology screens and controlled substances agreements were completed at this appointment.     [place] was reviewed today with no concerning findings evidenced.     You can continue aripiprazole 5 mg daily, duloxetine 60 mg daily and methylphenidate 20 mg once daily. You a clearly are able to recite back the risks, benefits, potential interactions and alternatives of these medications as discussed with you today.     The FDA risks of antidepressants including increased risk of suicide, cardiotoxicity, weight gain, lowered seizure threshold, the serotonin syndrome with serotonin agents, individually or with multiple serotonin agent interaction and anticholinergic effects have been discussed. We also discussed that at least in the case of SSRIs there is interference with warfarin and nonsteroidal inflammatories and can cause increased bleeding and hemorrhage. We reviewed the risks of agranulocytosis and neutropenia with sertraline and mirtazapine and that risk according to [employer] data is about 0.1% for both mirtazapine and sertraline. In the case of duloxetine we reviewed precautions, morbidity and mortality, regarding its application in hepatic or renal disease secondary to its metabolism by those organs. You clearly were able to recite back the risks, benefits and alternatives to the antidepressants discussed with you today.     The FDA risks of antipsychotics including increased risk of sudden death, heart attack, brain stroke, irreversible neurological movement disorders, parkinsonism, cardiac toxicity, weight gain and diabetes of this medication were discussed. You clearly were  able to recite the risks, benefits and alternatives of the antipsychotics discussed with you today.     The FDA risks of amphetamines including heart attack, abuse potential and drug interactions were discussed. You clearly were able to recite back to me the risks, benefits and alternatives to the amphetamines as discussed with you today.      Follow up in October of 2025.  Orders:    DULoxetine (Cymbalta) 60 mg DR capsule; Take 1 capsule (60 mg) by mouth once daily. Do not crush or chew.    ARIPiprazole (Abilify) 5 mg tablet; Take 1 tablet (5 mg) by mouth once daily.    Attention deficit disorder (ADD) without hyperactivity  Your diagnosis is depression and anxiety complicated by a history of ADD in substantial remission.     Treatment Plan:   Please follow up in October of 2025 and sooner virtually if needed as discussed today. For all urgent or emergency matters please call 911, go to urgent care or the emergency department of the nearest hospital.     Urine toxicology screens and controlled substances agreements were completed at this appointment.     [place]ARRS was reviewed today with no concerning findings evidenced.     You can continue aripiprazole 5 mg daily, duloxetine 60 mg daily and methylphenidate 20 mg once daily. You a clearly are able to recite back the risks, benefits, potential interactions and alternatives of these medications as discussed with you today.     The FDA risks of antidepressants including increased risk of suicide, cardiotoxicity, weight gain, lowered seizure threshold, the serotonin syndrome with serotonin agents, individually or with multiple serotonin agent interaction and anticholinergic effects have been discussed. We also discussed that at least in the case of SSRIs there is interference with warfarin and nonsteroidal inflammatories and can cause increased bleeding and hemorrhage. We reviewed the risks of agranulocytosis and neutropenia with sertraline and mirtazapine and that risk  according to [employer] data is about 0.1% for both mirtazapine and sertraline. In the case of duloxetine we reviewed precautions, morbidity and mortality, regarding its application in hepatic or renal disease secondary to its metabolism by those organs. You clearly were able to recite back the risks, benefits and alternatives to the antidepressants discussed with you today.     The FDA risks of antipsychotics including increased risk of sudden death, heart attack, brain stroke, irreversible neurological movement disorders, parkinsonism, cardiac toxicity, weight gain and diabetes of this medication were discussed. You clearly were able to recite the risks, benefits and alternatives of the antipsychotics discussed with you today.     The FDA risks of amphetamines including heart attack, abuse potential and drug interactions were discussed. You clearly were able to recite back to me the risks, benefits and alternatives to the amphetamines as discussed with you today.      Follow up in October of 2025.  Orders:    methylphenidate (Ritalin) 20 mg tablet; Take 1 tablet (20 mg) by mouth 2 times a day. Do not fill before July 15, 2025.    Time:   Prep time on date of the patient encounter: 5 minutes.   Time spent directly with patient/family/caregiver: 20 minutes.   Additional time spent on patient care activities:  minutes.   Documentation time: 5 minutes.   Total time on date of patient encounter: 30 minutes.

## 2025-07-04 LAB
ATRIAL RATE: 70 BPM
P AXIS: 45 DEGREES
P OFFSET: 212 MS
P ONSET: 164 MS
PR INTERVAL: 114 MS
Q ONSET: 221 MS
QRS COUNT: 11 BEATS
QRS DURATION: 72 MS
QT INTERVAL: 384 MS
QTC CALCULATION(BAZETT): 414 MS
QTC FREDERICIA: 404 MS
R AXIS: 35 DEGREES
T AXIS: 78 DEGREES
T OFFSET: 413 MS
VENTRICULAR RATE: 70 BPM

## 2025-07-07 ENCOUNTER — APPOINTMENT (OUTPATIENT)
Dept: ORTHOPEDIC SURGERY | Facility: CLINIC | Age: 55
End: 2025-07-07
Payer: COMMERCIAL

## 2025-07-07 PROCEDURE — E0114 CRUTCH UNDERARM PAIR NO WOOD: HCPCS | Performed by: ORTHOPAEDIC SURGERY

## 2025-07-08 ENCOUNTER — PHARMACY VISIT (OUTPATIENT)
Dept: PHARMACY | Facility: CLINIC | Age: 55
End: 2025-07-08
Payer: COMMERCIAL

## 2025-07-08 ENCOUNTER — HOSPITAL ENCOUNTER (OUTPATIENT)
Facility: HOSPITAL | Age: 55
Setting detail: OUTPATIENT SURGERY
Discharge: HOME | End: 2025-07-08
Attending: ORTHOPAEDIC SURGERY | Admitting: ORTHOPAEDIC SURGERY
Payer: COMMERCIAL

## 2025-07-08 ENCOUNTER — ANESTHESIA EVENT (OUTPATIENT)
Dept: OPERATING ROOM | Facility: HOSPITAL | Age: 55
End: 2025-07-08
Payer: COMMERCIAL

## 2025-07-08 ENCOUNTER — ANESTHESIA (OUTPATIENT)
Dept: OPERATING ROOM | Facility: HOSPITAL | Age: 55
End: 2025-07-08
Payer: COMMERCIAL

## 2025-07-08 VITALS
HEART RATE: 70 BPM | HEIGHT: 65 IN | OXYGEN SATURATION: 98 % | SYSTOLIC BLOOD PRESSURE: 103 MMHG | BODY MASS INDEX: 30.82 KG/M2 | DIASTOLIC BLOOD PRESSURE: 57 MMHG | TEMPERATURE: 96.8 F | RESPIRATION RATE: 17 BRPM | WEIGHT: 185 LBS

## 2025-07-08 DIAGNOSIS — S83.242A OTHER TEAR OF MEDIAL MENISCUS, CURRENT INJURY, LEFT KNEE, INITIAL ENCOUNTER: ICD-10-CM

## 2025-07-08 DIAGNOSIS — G89.18 POST-OPERATIVE PAIN: Primary | ICD-10-CM

## 2025-07-08 PROCEDURE — A29881 PR KNEE SCOPE,MED/LAT MENISECTOMY: Performed by: ANESTHESIOLOGIST ASSISTANT

## 2025-07-08 PROCEDURE — RXMED WILLOW AMBULATORY MEDICATION CHARGE

## 2025-07-08 PROCEDURE — 2500000005 HC RX 250 GENERAL PHARMACY W/O HCPCS: Performed by: ANESTHESIOLOGY

## 2025-07-08 PROCEDURE — 7100000010 HC PHASE TWO TIME - EACH INCREMENTAL 1 MINUTE: Performed by: ORTHOPAEDIC SURGERY

## 2025-07-08 PROCEDURE — A29881 PR KNEE SCOPE,MED/LAT MENISECTOMY: Performed by: ANESTHESIOLOGY

## 2025-07-08 PROCEDURE — 3700000002 HC GENERAL ANESTHESIA TIME - EACH INCREMENTAL 1 MINUTE: Performed by: ORTHOPAEDIC SURGERY

## 2025-07-08 PROCEDURE — 2500000004 HC RX 250 GENERAL PHARMACY W/ HCPCS (ALT 636 FOR OP/ED): Performed by: ORTHOPAEDIC SURGERY

## 2025-07-08 PROCEDURE — 3600000004 HC OR TIME - INITIAL BASE CHARGE - PROCEDURE LEVEL FOUR: Performed by: ORTHOPAEDIC SURGERY

## 2025-07-08 PROCEDURE — 2500000004 HC RX 250 GENERAL PHARMACY W/ HCPCS (ALT 636 FOR OP/ED): Performed by: ANESTHESIOLOGIST ASSISTANT

## 2025-07-08 PROCEDURE — 7100000009 HC PHASE TWO TIME - INITIAL BASE CHARGE: Performed by: ORTHOPAEDIC SURGERY

## 2025-07-08 PROCEDURE — 2720000007 HC OR 272 NO HCPCS: Performed by: ORTHOPAEDIC SURGERY

## 2025-07-08 PROCEDURE — 2500000001 HC RX 250 WO HCPCS SELF ADMINISTERED DRUGS (ALT 637 FOR MEDICARE OP): Performed by: ANESTHESIOLOGY

## 2025-07-08 PROCEDURE — 7100000001 HC RECOVERY ROOM TIME - INITIAL BASE CHARGE: Performed by: ORTHOPAEDIC SURGERY

## 2025-07-08 PROCEDURE — 3700000001 HC GENERAL ANESTHESIA TIME - INITIAL BASE CHARGE: Performed by: ORTHOPAEDIC SURGERY

## 2025-07-08 PROCEDURE — 7100000002 HC RECOVERY ROOM TIME - EACH INCREMENTAL 1 MINUTE: Performed by: ORTHOPAEDIC SURGERY

## 2025-07-08 PROCEDURE — 2500000004 HC RX 250 GENERAL PHARMACY W/ HCPCS (ALT 636 FOR OP/ED): Performed by: STUDENT IN AN ORGANIZED HEALTH CARE EDUCATION/TRAINING PROGRAM

## 2025-07-08 PROCEDURE — 3600000009 HC OR TIME - EACH INCREMENTAL 1 MINUTE - PROCEDURE LEVEL FOUR: Performed by: ORTHOPAEDIC SURGERY

## 2025-07-08 PROCEDURE — 29881 ARTHRS KNE SRG MNISECTMY M/L: CPT | Performed by: ORTHOPAEDIC SURGERY

## 2025-07-08 RX ORDER — SCOPOLAMINE 1 MG/3D
1 PATCH, EXTENDED RELEASE TRANSDERMAL ONCE
Status: DISCONTINUED | OUTPATIENT
Start: 2025-07-08 | End: 2025-07-08 | Stop reason: HOSPADM

## 2025-07-08 RX ORDER — MIDAZOLAM HYDROCHLORIDE 1 MG/ML
INJECTION, SOLUTION INTRAMUSCULAR; INTRAVENOUS AS NEEDED
Status: DISCONTINUED | OUTPATIENT
Start: 2025-07-08 | End: 2025-07-08

## 2025-07-08 RX ORDER — HYDRALAZINE HYDROCHLORIDE 20 MG/ML
5 INJECTION INTRAMUSCULAR; INTRAVENOUS EVERY 30 MIN PRN
Status: DISCONTINUED | OUTPATIENT
Start: 2025-07-08 | End: 2025-07-08 | Stop reason: HOSPADM

## 2025-07-08 RX ORDER — VITAMIN B COMPLEX
1 CAPSULE ORAL DAILY
COMMUNITY

## 2025-07-08 RX ORDER — PROCHLORPERAZINE EDISYLATE 5 MG/ML
5 INJECTION INTRAMUSCULAR; INTRAVENOUS
Status: DISCONTINUED | OUTPATIENT
Start: 2025-07-08 | End: 2025-07-08 | Stop reason: HOSPADM

## 2025-07-08 RX ORDER — ACETAMINOPHEN 325 MG/1
975 TABLET ORAL ONCE
Status: DISCONTINUED | OUTPATIENT
Start: 2025-07-08 | End: 2025-07-08 | Stop reason: HOSPADM

## 2025-07-08 RX ORDER — ONDANSETRON HYDROCHLORIDE 2 MG/ML
INJECTION, SOLUTION INTRAVENOUS AS NEEDED
Status: DISCONTINUED | OUTPATIENT
Start: 2025-07-08 | End: 2025-07-08

## 2025-07-08 RX ORDER — IBUPROFEN 600 MG/1
600 TABLET, FILM COATED ORAL EVERY 6 HOURS PRN
Status: DISCONTINUED | OUTPATIENT
Start: 2025-07-08 | End: 2025-07-08 | Stop reason: HOSPADM

## 2025-07-08 RX ORDER — OXYCODONE HYDROCHLORIDE 10 MG/1
10 TABLET ORAL EVERY 4 HOURS PRN
Status: DISCONTINUED | OUTPATIENT
Start: 2025-07-08 | End: 2025-07-08 | Stop reason: HOSPADM

## 2025-07-08 RX ORDER — HYDROMORPHONE HYDROCHLORIDE 1 MG/ML
1 INJECTION, SOLUTION INTRAMUSCULAR; INTRAVENOUS; SUBCUTANEOUS EVERY 5 MIN PRN
Status: DISCONTINUED | OUTPATIENT
Start: 2025-07-08 | End: 2025-07-08 | Stop reason: HOSPADM

## 2025-07-08 RX ORDER — OXYCODONE AND ACETAMINOPHEN 5; 325 MG/1; MG/1
1 TABLET ORAL EVERY 4 HOURS PRN
Status: DISCONTINUED | OUTPATIENT
Start: 2025-07-08 | End: 2025-07-08 | Stop reason: HOSPADM

## 2025-07-08 RX ORDER — METOPROLOL TARTRATE 1 MG/ML
2.5 INJECTION, SOLUTION INTRAVENOUS EVERY 30 MIN PRN
Status: DISCONTINUED | OUTPATIENT
Start: 2025-07-08 | End: 2025-07-08 | Stop reason: HOSPADM

## 2025-07-08 RX ORDER — ASPIRIN 81 MG/1
81 TABLET ORAL 2 TIMES DAILY
Qty: 28 TABLET | Refills: 0 | Status: SHIPPED | OUTPATIENT
Start: 2025-07-08 | End: 2025-07-22

## 2025-07-08 RX ORDER — PHENYLEPHRINE HCL IN 0.9% NACL 1 MG/10 ML
SYRINGE (ML) INTRAVENOUS AS NEEDED
Status: DISCONTINUED | OUTPATIENT
Start: 2025-07-08 | End: 2025-07-08

## 2025-07-08 RX ORDER — PROPOFOL 10 MG/ML
INJECTION, EMULSION INTRAVENOUS AS NEEDED
Status: DISCONTINUED | OUTPATIENT
Start: 2025-07-08 | End: 2025-07-08

## 2025-07-08 RX ORDER — CEFAZOLIN SODIUM 2 G/100ML
2 INJECTION, SOLUTION INTRAVENOUS ONCE
Status: COMPLETED | OUTPATIENT
Start: 2025-07-08 | End: 2025-07-08

## 2025-07-08 RX ORDER — MAGNESIUM GLUCONATE 27 MG(500)
27 TABLET ORAL DAILY
COMMUNITY

## 2025-07-08 RX ORDER — BUPIVACAINE HYDROCHLORIDE 5 MG/ML
INJECTION, SOLUTION PERINEURAL AS NEEDED
Status: DISCONTINUED | OUTPATIENT
Start: 2025-07-08 | End: 2025-07-08 | Stop reason: HOSPADM

## 2025-07-08 RX ORDER — ONDANSETRON HYDROCHLORIDE 2 MG/ML
4 INJECTION, SOLUTION INTRAVENOUS ONCE AS NEEDED
Status: DISCONTINUED | OUTPATIENT
Start: 2025-07-08 | End: 2025-07-08 | Stop reason: HOSPADM

## 2025-07-08 RX ORDER — MIDAZOLAM HYDROCHLORIDE 1 MG/ML
1 INJECTION, SOLUTION INTRAMUSCULAR; INTRAVENOUS ONCE AS NEEDED
Status: DISCONTINUED | OUTPATIENT
Start: 2025-07-08 | End: 2025-07-08 | Stop reason: HOSPADM

## 2025-07-08 RX ORDER — HYDROMORPHONE HYDROCHLORIDE 0.2 MG/ML
0.1 INJECTION INTRAMUSCULAR; INTRAVENOUS; SUBCUTANEOUS EVERY 5 MIN PRN
Status: DISCONTINUED | OUTPATIENT
Start: 2025-07-08 | End: 2025-07-08 | Stop reason: HOSPADM

## 2025-07-08 RX ORDER — HYDROCODONE BITARTRATE AND ACETAMINOPHEN 5; 325 MG/1; MG/1
1 TABLET ORAL EVERY 6 HOURS PRN
Qty: 12 TABLET | Refills: 0 | Status: SHIPPED | OUTPATIENT
Start: 2025-07-08 | End: 2025-07-13

## 2025-07-08 RX ORDER — SODIUM CHLORIDE, SODIUM LACTATE, POTASSIUM CHLORIDE, CALCIUM CHLORIDE 600; 310; 30; 20 MG/100ML; MG/100ML; MG/100ML; MG/100ML
125 INJECTION, SOLUTION INTRAVENOUS CONTINUOUS
Status: DISCONTINUED | OUTPATIENT
Start: 2025-07-08 | End: 2025-07-08 | Stop reason: HOSPADM

## 2025-07-08 RX ORDER — FENTANYL CITRATE 50 UG/ML
INJECTION, SOLUTION INTRAMUSCULAR; INTRAVENOUS AS NEEDED
Status: DISCONTINUED | OUTPATIENT
Start: 2025-07-08 | End: 2025-07-08

## 2025-07-08 RX ORDER — LIDOCAINE HYDROCHLORIDE 20 MG/ML
INJECTION, SOLUTION EPIDURAL; INFILTRATION; INTRACAUDAL; PERINEURAL AS NEEDED
Status: DISCONTINUED | OUTPATIENT
Start: 2025-07-08 | End: 2025-07-08

## 2025-07-08 RX ORDER — ALBUTEROL SULFATE 0.83 MG/ML
2.5 SOLUTION RESPIRATORY (INHALATION) ONCE AS NEEDED
Status: DISCONTINUED | OUTPATIENT
Start: 2025-07-08 | End: 2025-07-08 | Stop reason: HOSPADM

## 2025-07-08 RX ADMIN — ONDANSETRON 4 MG: 2 INJECTION INTRAMUSCULAR; INTRAVENOUS at 08:51

## 2025-07-08 RX ADMIN — Medication 200 MCG: at 08:35

## 2025-07-08 RX ADMIN — FENTANYL CITRATE 50 MCG: 50 INJECTION, SOLUTION INTRAMUSCULAR; INTRAVENOUS at 08:16

## 2025-07-08 RX ADMIN — PROPOFOL 40 MG: 10 INJECTION, EMULSION INTRAVENOUS at 08:44

## 2025-07-08 RX ADMIN — LIDOCAINE HYDROCHLORIDE 60 MG: 20 INJECTION, SOLUTION EPIDURAL; INFILTRATION; INTRACAUDAL; PERINEURAL at 08:16

## 2025-07-08 RX ADMIN — PROPOFOL 160 MG: 10 INJECTION, EMULSION INTRAVENOUS at 08:16

## 2025-07-08 RX ADMIN — Medication 6 L/MIN: at 09:13

## 2025-07-08 RX ADMIN — Medication 100 MCG: at 08:23

## 2025-07-08 RX ADMIN — FENTANYL CITRATE 25 MCG: 50 INJECTION, SOLUTION INTRAMUSCULAR; INTRAVENOUS at 08:52

## 2025-07-08 RX ADMIN — OXYCODONE HYDROCHLORIDE AND ACETAMINOPHEN 1 TABLET: 5; 325 TABLET ORAL at 10:45

## 2025-07-08 RX ADMIN — Medication 100 MCG: at 08:27

## 2025-07-08 RX ADMIN — MIDAZOLAM 2 MG: 1 INJECTION INTRAMUSCULAR; INTRAVENOUS at 08:07

## 2025-07-08 RX ADMIN — FENTANYL CITRATE 25 MCG: 50 INJECTION, SOLUTION INTRAMUSCULAR; INTRAVENOUS at 08:59

## 2025-07-08 RX ADMIN — SODIUM CHLORIDE, POTASSIUM CHLORIDE, SODIUM LACTATE AND CALCIUM CHLORIDE: 600; 310; 30; 20 INJECTION, SOLUTION INTRAVENOUS at 08:07

## 2025-07-08 RX ADMIN — DEXAMETHASONE SODIUM PHOSPHATE 4 MG: 4 INJECTION, SOLUTION INTRAMUSCULAR; INTRAVENOUS at 08:30

## 2025-07-08 RX ADMIN — CEFAZOLIN SODIUM 2 G: 2 INJECTION, SOLUTION INTRAVENOUS at 08:22

## 2025-07-08 SDOH — HEALTH STABILITY: MENTAL HEALTH: CURRENT SMOKER: 0

## 2025-07-08 ASSESSMENT — PAIN - FUNCTIONAL ASSESSMENT
PAIN_FUNCTIONAL_ASSESSMENT: 0-10
PAIN_FUNCTIONAL_ASSESSMENT: WONG-BAKER FACES
PAIN_FUNCTIONAL_ASSESSMENT: 0-10
PAIN_FUNCTIONAL_ASSESSMENT: 0-10
PAIN_FUNCTIONAL_ASSESSMENT: WONG-BAKER FACES
PAIN_FUNCTIONAL_ASSESSMENT: WONG-BAKER FACES
PAIN_FUNCTIONAL_ASSESSMENT: 0-10

## 2025-07-08 ASSESSMENT — PAIN SCALES - WONG BAKER
WONGBAKER_NUMERICALRESPONSE: NO HURT

## 2025-07-08 ASSESSMENT — COLUMBIA-SUICIDE SEVERITY RATING SCALE - C-SSRS
1. IN THE PAST MONTH, HAVE YOU WISHED YOU WERE DEAD OR WISHED YOU COULD GO TO SLEEP AND NOT WAKE UP?: NO
6. HAVE YOU EVER DONE ANYTHING, STARTED TO DO ANYTHING, OR PREPARED TO DO ANYTHING TO END YOUR LIFE?: NO
2. HAVE YOU ACTUALLY HAD ANY THOUGHTS OF KILLING YOURSELF?: NO

## 2025-07-08 ASSESSMENT — PAIN SCALES - GENERAL
PAINLEVEL_OUTOF10: 0 - NO PAIN
PAINLEVEL_OUTOF10: 3
PAINLEVEL_OUTOF10: 4
PAIN_LEVEL: 3
PAINLEVEL_OUTOF10: 0 - NO PAIN

## 2025-07-08 NOTE — ANESTHESIA PREPROCEDURE EVALUATION
Patient: Adán Liu    Procedure Information       Anesthesia Start Date/Time: 07/08/25 0807    Procedure: LEFT KNEE ARTHROSCOPIC MEDIAL MENISCECTOMY (Left: Knee) - OUTPATIENT    Location: [facility] OR  / Virtual [facility] OR    Surgeons: Pj Burr MD            Relevant Problems   Cardiac   (+) Primary hypertension      Neuro   (+) Mixed anxiety depressive disorder      GI   (+) Gastroesophageal reflux disease       Clinical information reviewed:   Tobacco  Allergies  Meds   Med Hx  Surg Hx   Fam Hx  Soc Hx        NPO Detail:  NPO/Void Status  Carbohydrate Drink Given Prior to Surgery? : N  Date of Last Liquid: 07/08/25  Time of Last Liquid: 0130  Date of Last Solid: 07/07/25  Time of Last Solid: 2200  Last Intake Type: Clear fluids  Time of Last Void: 0500         Physical Exam    Airway  Mallampati: II  TM distance: >3 FB  Neck ROM: full  Mouth opening: 3 or more finger widths     Cardiovascular - normal exam  Rhythm: regular  Rate: normal     Dental - normal exam     Pulmonary - normal examBreath sounds clear to auscultation     Abdominal (+) obese  Abdomen: soft  Bowel sounds: normal           Anesthesia Plan    History of general anesthesia?: no  History of complications of general anesthesia?: unknown/emergency    ASA 2     general   (No Family Hx of severe adverse reaction to GA among first degree blood relatives.)  The patient is not a current smoker.  Patient was previously instructed to abstain from smoking on day of procedure.  Patient did not smoke on day of procedure.  Education provided regarding risk of obstructive sleep apnea.  intravenous induction   Postoperative pain plan includes opioids.  Anesthetic plan and risks discussed with patient.  Use of blood products discussed with patient who consented to blood products.    Plan discussed with CAA.

## 2025-07-08 NOTE — ANESTHESIA POSTPROCEDURE EVALUATION
Patient: Adán Liu    Procedure Summary       Date: 07/08/25 Room / Location: [facility] OR 06 / Virtual [facility] OR    Anesthesia Start: 0807 Anesthesia Stop: 0912    Procedure: LEFT KNEE ARTHROSCOPIC MEDIAL MENISCECTOMY (Left: Knee) Diagnosis:       Other tear of medial meniscus, current injury, left knee, initial encounter      (Other tear of medial meniscus, current injury, left knee, initial encounter [S83.242A])    Surgeons: Pj Burr MD Responsible Provider: Primo Garcia MD    Anesthesia Type: general ASA Status: 2            Anesthesia Type: general    Vitals Value Taken Time   BP 99/58 07/08/25 10:15   Temp 36.5 °C (97.7 °F) 07/08/25 10:15   Pulse 74 07/08/25 10:21   Resp 12 07/08/25 10:21   SpO2 96 % 07/08/25 10:21   Vitals shown include unfiled device data.    Anesthesia Post Evaluation    Patient location during evaluation: PACU  Patient participation: complete - patient participated  Level of consciousness: sleepy but conscious, sedated, responsive to verbal stimuli, responsive to physical stimuli and responsive to light touch  Pain score: 3  Pain management: satisfactory to patient  Multimodal analgesia pain management approach  Airway patency: patent  Two or more strategies used to mitigate risk of obstructive sleep apnea  Cardiovascular status: acceptable, hemodynamically stable and stable  Respiratory status: acceptable, unassisted, spontaneous ventilation, nonlabored ventilation, nasal cannula and face mask  Hydration status: acceptable  Postoperative Nausea and Vomiting: none        No notable events documented.

## 2025-07-08 NOTE — OP NOTE
Operative Note     Date: 2025  OR Location: [facility] OR    Name: Adán Liu, : 1970, Age: 55 y.o., MRN: [id number], Sex: male    Diagnosis  Pre-op Diagnosis      * Other tear of medial meniscus, current injury, left knee, initial encounter [S86.242A] Post-op Diagnosis     * Other tear of medial meniscus, current injury, left knee, initial encounter [P53.242A]     Procedures  LEFT KNEE ARTHROSCOPIC MEDIAL MENISCECTOMY  38727 - FL ARTHRS KNE SURG W/MENISCECTOMY MED/LAT W/SHVG  WITH CHONDROPLASTY TROCHLEA AND MEDIAL FEMORAL CONDYLE    Surgeons      * Pj Burr - Primary    Resident/Fellow/Other Assistant:  Surgeons and Role:     * Earl Richey PA-C - Assisting    Staff:   Surgical Assistant:   Circulator: Florentino Nelson Person: Lukas Nelson Person: Tara    Anesthesia Staff: Anesthesiologist: Primo Garcia MD  C-AA: PRIYA Armstrong    Procedure Summary  Anesthesia: Anesthesia type not filed in the log.  ASA: ASA status not filed in the log.  Estimated Blood Loss: 5 mL  Intra-op Medications:   Administrations occurring from 0745 to 0910 on 25:   Medication Name Total Dose   BUPivacaine HCl (Marcaine) 0.5 % (5 mg/mL) injection 10 mL   dexAMETHasone (Decadron) 4 mg/mL IV Syringe 2 mL 4 mg   fentaNYL (Sublimaze) injection 50 mcg/mL 100 mcg   LR bolus Cannot be calculated   lidocaine PF (Xylocaine-MPF) local injection 2 % 60 mg   midazolam (Versed) injection 1 mg/mL 2 mg   ondansetron (Zofran) 2 mg/mL injection 4 mg   phenylephrine 100 mcg/mL syringe 10 mL (prefilled) 400 mcg   propofol (Diprivan) injection 10 mg/mL 200 mg   ceFAZolin (Ancef) 2 g in dextrose (iso)  mL 2 g              Anesthesia Record               Intraprocedure I/O Totals       None           Specimen: No specimens collected                Implants:     Findings:   Operative findings: (Outerbridge classification 0-4)   Patella: 2  Trochlea: 3 diffuse  Medial compartment: 3 diffuse femur, small area grade 4 tibia    Lateral compartment: 0-1    Indications:     The patient was seen in the preoperative area. The risks, benefits, complications, treatment options, non-operative alternatives, expected recovery and outcomes were discussed with the patient. The possibilities of reaction to medication, pulmonary aspiration, injury to surrounding structures, bleeding, recurrent infection, the need for additional procedures, failure to diagnose a condition, and creating a complication requiring transfusion or operation were discussed with the patient. The patient concurred with the proposed plan, giving informed consent.  The site of surgery was properly noted/marked if necessary per policy. The patient has been actively warmed in preoperative area. Preoperative antibiotics ordered and given within 1 hours of incision. Venous thrombosis prophylaxis have been ordered.     Patient was noted to have internal derangement of the knee refractory to non-surgical modalities.  We discussed associated interventions and the risks of the surgery, including DVT/PE, infection, stiffness, medical complications, and incomplete relief of pre-operative symptoms.    Procedure Details:   The patient was met prior to surgery and the appropriate extremity was marked.  We reviewed recent health history and found no contraindication to proceeding with the planned procedure.  The patient was transported to the operating room and underwent general anesthesia.  The patient was positioned supine on the operative table with all liliana prominences well-padded. A sequential compression device was placed on the contralateral leg.  A non-sterile thigh tourniquet was placed and a padded lateral thigh post.    The leg was prepped and draped in the usual sterile fashion.  A timeout was completed and antibiotic administration was in accordance with standard protocol.  The leg was exsanguinated using an Esmarch bandage and the tourniquet was inflated.  The superficial  landmarks of the knee were palpated and anteromedial and anterolateral portals were created.    A diagnostic arthroscopy was performed utilizing a fluid pump with sterile saline.  The articular surface of the patella, trochlea, medial and lateral femoral condyles and tibial plateaus were evaluated.  The Outerbridge classifications are listed above.  The gutters were evaluated and no loose bodies were noted. The medial compartment was entered with valgus stress in a slightly flexed knee against the lateral post.  The assistant helped with this to facilitate visualization.  The meniscus was probed superiorly and inferiorly using a blunt probe.  The notch was inspected and the ACL and PCL were healthy in appearance and had appropriate tension when probed.  The knee was then flexed into a figure of four position and the lateral compartment was entered.      The articular surface of the trochlea and medial femoral condyle had chondral wear and a component of delamination which we felt could contribute to mechanical symptoms.  A 3.5 mm aggressive plus shaver was used to debride the articular cartilage until stable margins were obtained.    The medial meniscus was noted to have a degenerative tear with a displaced fragment.   Using an arthroscopic shaver and meniscal biters, we debrided the meniscus back until we obtained healthy and stable margins.  The meniscectomy extended about 75 % to the periphery in the affected area.    The medial compartment was fairly tight and the medial collateral ligament was lengthening using an 18 gauge spinal needle in an outside-in fashion. This was done in a controlled fashion and no instability was noted on physical examination after.    The knee was irrigated and lavaged to remove and loose meniscal or chondral debris.  A second pass was made diagnostically to confirm removal of any fragments and inspect for any additional pathology.  The residual fluid was expressed from the knee.  The  portals were closed using a buried 3-0 monocryl suture.  Local anesthetic was injected into the soft tissue around the portals. Sterile bandages were placed.  The patient was stable to the recovery room.    I was present and participated in the entire procedure. The Physician Assistant participated in all critical elements of the procedure under my direct supervision. The surgical incision was closed by the PA under my indirect supervision. There were no qualified residents available to assist.    The physician assistant was present for the entire case.  Given the nature of the procedure and disease process, a skilled surgical assistant was necessary for the case.  The assistant was necessary for retraction and helped directly facilitate completion of the surgery.  A certified scrub tech was at the back table managing instruments and supplies for the surgical procedure.      Complications:  None; patient tolerated the procedure well.    Disposition: PACU - hemodynamically stable.  Condition: stable         Pj Burr  Phone Number: [phone]

## 2025-07-08 NOTE — DISCHARGE INSTRUCTIONS
Post-Operative Instructions - Basic Knee Arthroscopy    Dr. Pj Burr    Activity / Swelling:  Okay to weightbear as tolerated immediately.  A brace is NOT needed. Crutches are used for balance and support but are only needed until patient feels safe ambulating.  ICE PACK to assist with pain control (should not be applied directly to skin)  Use fairly continuously until you remove the post-op dressing, after dressing removed, use for up to 20 minutes every hour as needed for pain and swelling     Diet:  Gradually resume your normal diet as tolerated following surgery.  The evening of your surgical day, begin with liquids and/or light foods.  If you are feeling well enough the next morning, progress to your normal eating patterns    Dressing care /Showering / Hygiene:  Keep operative dressing clean, dry, and intact.   Sutures are generally absorbable and do not need to be removed.    Remove dressing on Post-Op Day# 3    Leave the Steri-strips (small white tapes across the incisions) in place.  If no Steri-strips or they come off with dressing cover incisions with a regular / large band aid.  Do not apply lotions or ointments to incisions.  Observe the incision sites for increased redness, drainage, or foul odor.     Showering:  Once the dressing has been removed, you may shower. Incisions may be gently cleansed with mild soap. Do not scrub or rub incisions. No baths, hot-tubs, pools, or immersive soaking of any kind until incisions are healed.      Medications:  Pain:  You will be given a prescription for pain medication after your surgery.  It should be taken as needed only and in many cases is NOT needed.  The goal is to discontinue it as quickly as possible.  DO NOT drive or operate heavy machinery while taking this medication as it will make you drowsy.  Do not take any additional pain medications.  This medication often continues Tylenol / acetaminophen and NO additional acetaminophen should be  taken.    You may want to consider also taking over-the-counter stool softener and/or laxative (Colace, Senekot or Dulcolax). These medications should be taken as directed and only short term.    In addition to pain medication recommend over the counter Ibuprofen 600 mg every 6-8 hours (If tolerated prior to surgery).  Take with food and and can also take an over the counter medicine (Pepcid/omeprazole) to help protect the stomach.  Do not take the ibuprofen if prior bleeding issues or history of ulcers.     Prevention of Blood Clots:  81 mg of aspirin (over the counter) twice a day to start the day after surgery for 2 weeks.   Calf pumps should be done at rest.  If you have a history of blood clots you may receive a different prescription (for example: lovenox, xarelto, eliquis)     If already on blood thinners can resume POD #1 and will not take aspirin.    Physical therapy:  Will be discussed at first follow-up appointment.  Therapy can start immediately if already scheduled but is often not necessary.    Home therapy can begin the day after surgery ~ 4 times a day for 20 min:  -  Prop up the heel and working on gently pushing down on thigh to promote a STRAIGHT LEG.    -  Quad sets: with leg straight tighten quad muscles and hold for 5 seconds.    -   Bending can be limited by swelling but okay to bend the leg immediately and as much as tolerated.    Driving: once off pain medications & crutches, and good leg control is achieved.  Will be discussed at first visit.    Follow-up:         Generally 10-14 days post-op      Call with any concerns, especially calf pain, signs of infection (fever, drainage) or shortness of breath.  [phone]

## 2025-07-14 DIAGNOSIS — F98.8 ATTENTION DEFICIT DISORDER (ADD) WITHOUT HYPERACTIVITY: ICD-10-CM

## 2025-07-14 RX ORDER — METHYLPHENIDATE HYDROCHLORIDE 20 MG/1
20 TABLET ORAL 2 TIMES DAILY
Qty: 60 TABLET | Refills: 0 | Status: SHIPPED | OUTPATIENT
Start: 2025-07-15 | End: 2025-08-14

## 2025-07-21 ENCOUNTER — TELEPHONE (OUTPATIENT)
Dept: ORTHOPEDIC SURGERY | Facility: CLINIC | Age: 55
End: 2025-07-21
Payer: COMMERCIAL

## 2025-07-21 NOTE — TELEPHONE ENCOUNTER
Sent patient a my chart message advising that work note can be given at appointment tomorrow after  evaluates and approves.

## 2025-07-21 NOTE — TELEPHONE ENCOUNTER
would like to know if he can return to work today. Had surgery 2 weeks ago. plese call him at [phone]

## 2025-07-22 ENCOUNTER — OFFICE VISIT (OUTPATIENT)
Dept: ORTHOPEDIC SURGERY | Facility: CLINIC | Age: 55
End: 2025-07-22
Payer: COMMERCIAL

## 2025-07-22 DIAGNOSIS — S83.249D ACUTE MEDIAL MENISCUS TEAR, UNSPECIFIED LATERALITY, SUBSEQUENT ENCOUNTER: Primary | ICD-10-CM

## 2025-07-22 PROCEDURE — 99212 OFFICE O/P EST SF 10 MIN: CPT | Performed by: ORTHOPAEDIC SURGERY

## 2025-07-22 PROCEDURE — 99024 POSTOP FOLLOW-UP VISIT: CPT | Performed by: ORTHOPAEDIC SURGERY

## 2025-07-22 NOTE — LETTER
July 22, 2025     Patient: Adán Liu   YOB: 1970   Date of Visit: 7/22/2025       To Whom It May Concern:    It is my medical opinion that Adán Liu may return to work on 07/22/2025 with no restrictions.    If you have any questions or concerns, please don't hesitate to call.         Sincerely,        Pj Burr MD  Electronic Signature    CC: No Recipients

## 2025-07-22 NOTE — PROGRESS NOTES
History of Present Illness:  Patient returns today noting minimal pain.  Denies any calf pain or shortness of breath.    Physical Examination:   Mild effusion  Healthy incisions, maculopapular rash  Good range of motion  No calf swelling  Negative Oly´s test  Distal neurovascular exam intact    Operative Findings:  Operative findings: (Outerbridge classification 0-4)   Patella: 2  Trochlea: 3 diffuse  Medial compartment: 3 diffuse femur, small area grade 4 tibia   Lateral compartment: 0-1    Assessment:  Patient status post left knee arthroscopy partial medial meniscectomy chondroplasty    Plan:  Reviewed arthroscopic photos and findings.  Discussed short and long term implications for the knee.  Discussed analgesics, ice, rest.  Encouraged home exercise program, physical therapy.

## 2025-08-01 DIAGNOSIS — I77.79 DISSECTION OF MESENTERIC ARTERY (MULTI): ICD-10-CM

## 2025-08-01 DIAGNOSIS — I10 PRIMARY HYPERTENSION: ICD-10-CM

## 2025-08-04 RX ORDER — ATORVASTATIN CALCIUM 40 MG/1
40 TABLET, FILM COATED ORAL DAILY
Qty: 90 TABLET | Refills: 0 | Status: SHIPPED | OUTPATIENT
Start: 2025-08-04

## 2025-08-04 RX ORDER — LISINOPRIL 10 MG/1
10 TABLET ORAL DAILY
Qty: 90 TABLET | Refills: 0 | Status: SHIPPED | OUTPATIENT
Start: 2025-08-04

## 2025-08-13 DIAGNOSIS — F98.8 ATTENTION DEFICIT DISORDER (ADD) WITHOUT HYPERACTIVITY: ICD-10-CM

## 2025-08-13 RX ORDER — METHYLPHENIDATE HYDROCHLORIDE 20 MG/1
20 TABLET ORAL 2 TIMES DAILY
Qty: 60 TABLET | Refills: 0 | Status: SHIPPED | OUTPATIENT
Start: 2025-08-13 | End: 2025-09-12

## 2025-10-30 ENCOUNTER — APPOINTMENT (OUTPATIENT)
Dept: BEHAVIORAL HEALTH | Facility: CLINIC | Age: 55
End: 2025-10-30
Payer: COMMERCIAL

## (undated) DEVICE — GLOVE, SURGICAL, PROTEXIS PI , 7.5, PF, LF

## (undated) DEVICE — Device

## (undated) DEVICE — BANDAGE, COFLEX, 6 X 5 YDS, FOAM TAN, STERILE, LF

## (undated) DEVICE — TUBING, PATIENT 8FT STERILE

## (undated) DEVICE — TOWEL PACK, STERILE, 4/PACK, BLUE

## (undated) DEVICE — BLADE, STRYKER, 4.0MM, AGG PLUS SHVBLD ULTMT

## (undated) DEVICE — NEEDLE, HYPODERMIC, SPECIALTY, REGULAR WALL, SHORT BEVEL, 18 G X 1.5 IN

## (undated) DEVICE — DRESSING, ABDOMINAL, WET PRUF, TENDERSORB, 5 X 9 IN, STERILE

## (undated) DEVICE — APPLICATOR, CHLORAPREP, W/ORANGE TINT, 26ML

## (undated) DEVICE — GLOVE, SURGICAL, PROTEXIS PI BLUE W/NEUTHERA, 7.5, PF, LF

## (undated) DEVICE — STRIP, SKIN CLOSURE, STERI STRIP, REINFORCED, 0.5 X 4 IN

## (undated) DEVICE — DRESSING, GAUZE, SUPER KERLIX, 6X6

## (undated) DEVICE — SUTURE, MONOCRYL, 3-0, 27 IN, PS-2, UNDYED

## (undated) DEVICE — DRESSING, GAUZE, PETROLATUM, PATCH, XEROFORM, 1 X 8 IN, STERILE

## (undated) DEVICE — BANDAGE, ELASTIC, MATRIX, SELF-CLOSURE, 6 IN X 5 YD, LF

## (undated) DEVICE — TUBING, PUMP REDEUCE 8FT STERILE

## (undated) DEVICE — BANDAGE, ESMARK, 6 IN X 12 FT

## (undated) DEVICE — PADDING, WEBRIL, UNDERCAST, STERILE, 6 IN